# Patient Record
Sex: FEMALE | Race: WHITE
[De-identification: names, ages, dates, MRNs, and addresses within clinical notes are randomized per-mention and may not be internally consistent; named-entity substitution may affect disease eponyms.]

---

## 2017-01-08 ENCOUNTER — HOSPITAL ENCOUNTER (EMERGENCY)
Dept: HOSPITAL 47 - EC | Age: 27
Discharge: HOME | End: 2017-01-08
Payer: COMMERCIAL

## 2017-01-08 VITALS
TEMPERATURE: 97.3 F | HEART RATE: 69 BPM | SYSTOLIC BLOOD PRESSURE: 121 MMHG | RESPIRATION RATE: 18 BRPM | DIASTOLIC BLOOD PRESSURE: 67 MMHG

## 2017-01-08 DIAGNOSIS — Z3A.17: ICD-10-CM

## 2017-01-08 DIAGNOSIS — O99.512: Primary | ICD-10-CM

## 2017-01-08 DIAGNOSIS — J06.9: ICD-10-CM

## 2017-01-08 DIAGNOSIS — Z88.2: ICD-10-CM

## 2017-01-08 PROCEDURE — 99283 EMERGENCY DEPT VISIT LOW MDM: CPT

## 2017-01-08 NOTE — ED
General Adult HPI





- General


Chief complaint: Abdominal Pain


Stated complaint: cough


Time Seen by Provider: 17 15:49


Source: patient, RN notes reviewed


Mode of arrival: ambulatory


Limitations: no limitations





- History of Present Illness


Initial comments: 





Patient is a 26-year-old female who presents emergency room today with a chief 

complaint of cough congestion over the last week.  She states she is worried 

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 

pneumonia.  States she's had a cough no sputum production.  She states this 

started with a mild sore throat week ago which has gone away.  States she still 

having this cough which is deep.  She denies any other complaints.  Does admit 

that she 17 weeks pregnant.  States that she's felt some weird movements in her 

abdomen.  Patient denies any other complaints or symptoms.  Denies any vaginal 

bleeding or discharge. Patient denies any recent fever, chills, shortness of 

breath, chest pain, back pain, nausea or vomiting, numbness or tingling, 

dysuria or hematuria, constipation or diarrhea, headaches or visual changes, or 

any other complaints.





- Related Data


 Home Medications











 Medication  Instructions  Recorded  Confirmed


 


Pnv with Ca,No.72/Iron/FA 1 tab PO DAILY 17





[Prenatal Plus Tablet]   











 Allergies











Allergy/AdvReac Type Severity Reaction Status Date / Time


 


clarithromycin [From Biaxin] Allergy  Rash/Hives Verified 17 15:51














Review of Systems


ROS Statement: 


Those systems with pertinent positive or pertinent negative responses have been 

documented in the HPI.





ROS Other: All systems not noted in ROS Statement are negative.





Past Medical History


Past Medical History: No Reported History


History of Any Multi-Drug Resistant Organisms: None Reported


Past Surgical History:  Section


Past Psychological History: No Psychological Hx Reported


Smoking Status: Never smoker


Past Alcohol Use History: None Reported, Occasional


Past Drug Use History: None Reported





General Exam





- General Exam Comments


Initial Comments: 





General:  The patient is awake and alert, in no distress, and does not appear 

acutely ill. 


Eye:  Pupils are equal, round and reactive to light, extra-ocular movements are 

intact.  No nystagmus.  There is normal conjunctiva bilaterally.  No signs of 

icterus.  


Ears, nose, mouth and throat:  There are moist mucous membranes and no oral 

lesions. 


Neck:  The neck is supple, there is no tenderness or JVD.  


Cardiovascular:  There is a regular rate and rhythm. No murmur, rub or gallop 

is appreciated.


Respiratory:  Lungs are clear to auscultation, respirations are non-labored, 

breath sounds are equal.  No wheezes, stridor, rales, or rhonchi.


Gastrointestinal:  Soft, non-distended, non-tender abdomen without masses or 

organomegaly noted. There is no rebound or guarding present.  No CVA 

tenderness. Bowel sounds are unremarkable.


Musculoskeletal:  Normal ROM, no tenderness.  Strength 5/5. Sensation intact. 

Pulses equal bilaterally 2+.  


Neurological:  A&O x 3. CN II-XII intact, There are no obvious motor or sensory 

deficits. Coordination appears grossly intact. Speech is normal.


Skin:  Skin is warm and dry and no rashes or lesions are noted. 


Psychiatric:  Cooperative, appropriate mood & affect, normal judgment.  


Limitations: no limitations





Course


 Vital Signs











  17





  15:40


 


Temperature 98.9 F


 


Pulse Rate 90


 


Respiratory 20





Rate 


 


Blood Pressure 140/72


 


O2 Sat by Pulse 100





Oximetry 














Medical Decision Making





- Medical Decision Making





Patient's fetal heart tones 150s.  Patient denies any shortness of breath.  No 

pleuritic pain.  Vitals are stable.  Patient does admit to cough congestion 

started with a sore throat.  It was discussed about possible bronchitis.  Was 

discussed about x-ray.  Patient's 17 weeks pregnant.  No vaginal bleeding or 

discharge.  Patient denies most likely viral infection.  She states like to 

hold on x-ray.  Hold any antibiotics.  Comfortable following up over the next 2 

days.  Advised return if any symptoms increase or worsen.





Disposition


Clinical Impression: 


 Upper respiratory infection





Disposition: HOME SELF-CARE


Condition: Good


Instructions:  Upper Respiratory Infection (ED)


Additional Instructions: 


Please follow-up with family doctor in the next 2 days of symptoms have not 

improved.  Please return to emergency room if the symptoms increase or worsen 

or for any other concerns.


Time of Disposition: 17:20

## 2017-06-09 ENCOUNTER — HOSPITAL ENCOUNTER (INPATIENT)
Dept: HOSPITAL 47 - 4FBP | Age: 27
LOS: 2 days | Discharge: HOME | End: 2017-06-11
Payer: COMMERCIAL

## 2017-06-09 VITALS — BODY MASS INDEX: 45.2 KG/M2

## 2017-06-09 VITALS — RESPIRATION RATE: 16 BRPM

## 2017-06-09 DIAGNOSIS — Z79.899: ICD-10-CM

## 2017-06-09 DIAGNOSIS — L29.9: ICD-10-CM

## 2017-06-09 DIAGNOSIS — O34.211: Primary | ICD-10-CM

## 2017-06-09 DIAGNOSIS — Z3A.39: ICD-10-CM

## 2017-06-09 LAB
BASOPHILS # BLD AUTO: 0 K/UL (ref 0–0.2)
BASOPHILS NFR BLD AUTO: 0 %
CH: 29
CHCM: 33.4
EOSINOPHIL # BLD AUTO: 0.1 K/UL (ref 0–0.7)
EOSINOPHIL NFR BLD AUTO: 1 %
ERYTHROCYTE [DISTWIDTH] IN BLOOD BY AUTOMATED COUNT: 3.88 M/UL (ref 3.8–5.4)
ERYTHROCYTE [DISTWIDTH] IN BLOOD: 14.8 % (ref 11.5–15.5)
HCT VFR BLD AUTO: 33.8 % (ref 34–46)
HDW: 3.24
HGB BLD-MCNC: 11.3 GM/DL (ref 11.4–16)
LUC NFR BLD AUTO: 2 %
LYMPHOCYTES # SPEC AUTO: 1.4 K/UL (ref 1–4.8)
LYMPHOCYTES NFR SPEC AUTO: 18 %
MCH RBC QN AUTO: 29.1 PG (ref 25–35)
MCHC RBC AUTO-ENTMCNC: 33.4 G/DL (ref 31–37)
MCV RBC AUTO: 87.1 FL (ref 80–100)
MONOCYTES # BLD AUTO: 0.4 K/UL (ref 0–1)
MONOCYTES NFR BLD AUTO: 6 %
NEUTROPHILS # BLD AUTO: 5.7 K/UL (ref 1.3–7.7)
NEUTROPHILS NFR BLD AUTO: 74 %
WBC # BLD AUTO: 0.16 10*3/UL
WBC # BLD AUTO: 7.7 K/UL (ref 3.8–10.6)
WBC (PEROX): 7.98

## 2017-06-09 PROCEDURE — 86901 BLOOD TYPING SEROLOGIC RH(D): CPT

## 2017-06-09 PROCEDURE — 86900 BLOOD TYPING SEROLOGIC ABO: CPT

## 2017-06-09 PROCEDURE — 86850 RBC ANTIBODY SCREEN: CPT

## 2017-06-09 PROCEDURE — 85025 COMPLETE CBC W/AUTO DIFF WBC: CPT

## 2017-06-09 PROCEDURE — 88307 TISSUE EXAM BY PATHOLOGIST: CPT

## 2017-06-09 RX ADMIN — KETOROLAC TROMETHAMINE PRN MG: 30 INJECTION, SOLUTION INTRAMUSCULAR at 19:42

## 2017-06-09 RX ADMIN — POTASSIUM CHLORIDE SCH MLS/HR: 14.9 INJECTION, SOLUTION INTRAVENOUS at 09:40

## 2017-06-09 RX ADMIN — DOCUSATE SODIUM AND SENNOSIDES SCH EACH: 50; 8.6 TABLET ORAL at 19:42

## 2017-06-09 NOTE — P.OP
Date of Procedure: 17


Preoperative Diagnosis: 


39 week intrauterine pregnancy, previous  section, declining 


Postoperative Diagnosis: 


Repeat low transverse  section, unremarkable, nuchal cord 1, liveborn 

male infant


Procedure(s) Performed: 


Repeat low transverse  section


Implants: 





Anesthesia: spinal


Surgeon: Cele Myers


Assistant #1: Gwen Lam


Estimated Blood Loss (ml): 600


IV fluids (ml): 600


Urine output (ml): 100


Pathology: other (Placenta)


Condition: stable


Disposition: PACU


Indications for Procedure: 





Operative Findings: 





Description of Procedure: 


Patient is brought to the operating suite where a spinal analgesia with 

Duramorph is administered.  Patient was placed in the dorsal supine position 

with left lateral uterine displacement.  The abdomen is prepped and draped in 

usual sterile fashion.  Analgesia is checked and noted to be adequate.  The 

appropriate timeout is performed to assure proper patient and procedural 

identification.  2 g of Ancef are given.  A low transverse skin incision is 

made in this is carried down through the subcutaneous tissue which is 

approximate 6 cm deep.  The fascia is isolated, scored and extended bilaterally 

with curved Gray scissors.  Peritoneum is next identified and incised, there is 

no bowel or bladder involvement.  Bladder flap is brought down from previous 

scarring, bladder is well mobilized and at all times swept well from the 

operative field to avoid bladder and/or ureteral injury.





A repeat low transverse uterine incision is made.  This is carried down to this 

endometrial cavity, and extended bilaterally bluntly.  Artificial amniorrhexis 

reveals clear fluid.  Infant is delivered in the right occiput transverse 

position.  There is a nuchal cord 1.  The oropharynx, and external nares and 

nasopharynx were all bulb suctioned.  Patient is officially delivered of a male 

infant at 1005 hrs.  Umbilical cord is doubly clamped and ligated, he is handed 

to waiting nurses for evaluation where Apgar scores of 8 and 9 at one and 5 

minutes respectively are given.  The placenta is delivered manually, it is 

inspected and noted to be intact with trivascular cord at 1006 hrs.  The uterus 

is then externalized.  The edges are grasped with Huntley clamps.  The 

uterus is swept clean with a sterile sponge to avoid any retained products of 

conception.  The uterus is closed in a two-step fashion, first layer running 

locking, second layer imbricated, both with 0 Vicryl suture for excellent 

reapproximation and hemostasis.  Bilateral tubes and ovaries are inspected and 

noted to be normal.  Abdomen is suctioned with suction on guard and the uterus 

is gently placed back into the abdominal cavity.  Bilateral gutters are 

inspected and cleaned.





Peritoneum is allowed to close by secondary intention.  Fascia is closed in a 

running stitch, over ligation in the midline.  Subcutaneous tissue is irrigated

, noted to be clean and dry.  It is reapproximated with 3-0 Vicryl in a running 

fashion.  4-0 undyed Vicryl issues with a final subcuticular stitch for 

excellent reapproximation of the skin.  Steri-Strips and Mastisol are applied 

to the wound.  Sterile dressing is applied.  Uterus is then massaged.  No 

lochia is noted, therefore I dilate the cervix manually for a minimal to 

moderate amount of lochia.  Urine is clear in the Smiley tube as well as in the 

bag.  All sponge needle and enhancement counts are correct at the end of the 

procedure.  Patient is brought back to the recovery room in stable condition 

with stable vital signs blood pressure 118/58, pulse 81, respirations 16, 100% 

O2 saturation.  Complications none.  Patient is requesting circumcision for her 

 infant son.

## 2017-06-09 NOTE — P.HPOB
History of Present Illness


H&P Date: 17


Chief Complaint: Here for elective repeat 





This is a 26-year-old white female  2 para 1001 EDC 2017 at 39 

weeks gestation.  Patient presents this morning for repeat low transverse 

 section, she has been counseled thoroughly and is declining the option 

for .  She had a prior  section in .  She is declining option 

for tubal ligation.  She has been having rare irregular contractions.





Prenatal history blood type is A+ rubella status immune.  Group B strep 

cultures negative.  Gonorrhea and chlamydia cultures, hepatitis B surface 

antigen, HIV testing, VDRL all negative.  One-hour Glucola 104.





Past medical history is essentially negative.





Past surgical history  section .





Current medications prenatal vitamins daily.





ALLERGIES include clindamycin to which reports hives and rash.





Social history patient is single, she is a nonsmoker, she denies alcohol or 

drug use with this pregnancy.





On exam this is a pleasant white female 5 foot 5 inches 272 pounds blood 

pressure 117/79 vital signs are otherwise stable and the patient is afebrile.  

The general physical exam is within normal limits.  The abdomen is obviously 

gravid, fundal height is 40 cm, vertex presentation to Leopold's maneuvers.  

Cervix is long closed posterior.  Fetal heart rate is consistent with reactive 

NST.  Extremities reveal no edema.  Chest is clear.





Impression: 39 week intrauterine pregnancy, previous  section declining 

, here for repeat low transverse  section, declining tubal ligation.





Plan: We will proceed with repeat low transverse  section.  Patient is 

aware of all risks benefits and alternatives.  All questions answered.





Review of Systems


Constitutional: Reports as per HPI





Past Medical History


Past Medical History: No Reported History


History of Any Multi-Drug Resistant Organisms: None Reported


Past Surgical History:  Section


Past Anesthesia/Blood Transfusion Reactions: No Reported Reaction


Past Psychological History: No Psychological Hx Reported


Smoking Status: Never smoker


Past Alcohol Use History: None Reported


Past Drug Use History: None Reported





- Past Family History


  ** Mother


Family Medical History: No Reported History





  ** Father


Family Medical History: Hypertension





Medications and Allergies


 Home Medications











 Medication  Instructions  Recorded  Confirmed  Type


 


Pnv,Calcium 72/Iron/Folic Acid 1 tab PO DAILY 17 History





[Prenatal Plus Tablet]    











 Allergies











Allergy/AdvReac Type Severity Reaction Status Date / Time


 


clarithromycin [From Biaxin] Allergy  Rash/Hives Verified 17 08:30














Exam





- Vital Signs


Vital signs: 


 Vital Signs











  Temp Pulse Resp BP


 


 17 08:29  97.3 F L  106 H  16  117/79








 Intake and Output











 17





 22:59 06:59 14:59


 


Other:   


 


  Weight   123.377 kg








 Patient Weight











 06/10/17





 06:59


 


Weight 123.377 kg














See full dictation under HPI please





Results


Result Diagrams: 


 17 08:10





 Abnormal Lab Results - Last 24 Hours (Table)











  17 Range/Units





  08:10 


 


Hgb  11.3 L  (11.4-16.0)  gm/dL


 


Hct  33.8 L  (34.0-46.0)  %














Assessment and Plan


Plan: 





For repeat low transverse  section.  All risks benefits and 

alternatives once again reviewed.


Time with Patient: Less than 30

## 2017-06-10 LAB
BASOPHILS # BLD AUTO: 0 K/UL (ref 0–0.2)
BASOPHILS NFR BLD AUTO: 0 %
CH: 28.7
CHCM: 31.5
EOSINOPHIL # BLD AUTO: 0 K/UL (ref 0–0.7)
EOSINOPHIL NFR BLD AUTO: 1 %
ERYTHROCYTE [DISTWIDTH] IN BLOOD BY AUTOMATED COUNT: 3.5 M/UL (ref 3.8–5.4)
ERYTHROCYTE [DISTWIDTH] IN BLOOD: 15 % (ref 11.5–15.5)
HCT VFR BLD AUTO: 32 % (ref 34–46)
HDW: 2.9
HGB BLD-MCNC: 10.3 GM/DL (ref 11.4–16)
LUC NFR BLD AUTO: 1 %
LYMPHOCYTES # SPEC AUTO: 1.1 K/UL (ref 1–4.8)
LYMPHOCYTES NFR SPEC AUTO: 16 %
MCH RBC QN AUTO: 29.5 PG (ref 25–35)
MCHC RBC AUTO-ENTMCNC: 32.2 G/DL (ref 31–37)
MCV RBC AUTO: 91.5 FL (ref 80–100)
MONOCYTES # BLD AUTO: 0.4 K/UL (ref 0–1)
MONOCYTES NFR BLD AUTO: 5 %
NEUTROPHILS # BLD AUTO: 5.3 K/UL (ref 1.3–7.7)
NEUTROPHILS NFR BLD AUTO: 77 %
WBC # BLD AUTO: 0.09 10*3/UL
WBC # BLD AUTO: 6.9 K/UL (ref 3.8–10.6)
WBC (PEROX): 7.41

## 2017-06-10 RX ADMIN — POTASSIUM CHLORIDE SCH: 14.9 INJECTION, SOLUTION INTRAVENOUS at 06:04

## 2017-06-10 RX ADMIN — KETOROLAC TROMETHAMINE PRN MG: 30 INJECTION, SOLUTION INTRAMUSCULAR at 05:30

## 2017-06-10 RX ADMIN — ACETAMINOPHEN AND CODEINE PHOSPHATE PRN EACH: 300; 30 TABLET ORAL at 14:17

## 2017-06-10 RX ADMIN — POTASSIUM CHLORIDE SCH: 14.9 INJECTION, SOLUTION INTRAVENOUS at 06:05

## 2017-06-10 RX ADMIN — IBUPROFEN PRN MG: 600 TABLET ORAL at 10:18

## 2017-06-10 RX ADMIN — IBUPROFEN PRN MG: 600 TABLET ORAL at 17:41

## 2017-06-10 RX ADMIN — ACETAMINOPHEN AND CODEINE PHOSPHATE PRN EACH: 300; 30 TABLET ORAL at 00:24

## 2017-06-10 RX ADMIN — ACETAMINOPHEN AND CODEINE PHOSPHATE PRN EACH: 300; 30 TABLET ORAL at 20:30

## 2017-06-10 RX ADMIN — DOCUSATE SODIUM AND SENNOSIDES SCH: 50; 8.6 TABLET ORAL at 19:53

## 2017-06-10 NOTE — P.PN
Progress Note - Text





Date:6/10 Time:1528





Patient is status post . Patient seen this morning with VAS score of 

2. c/o of pruritus, c/o nausea/vomiting, comfortable and doing better today.

## 2017-06-10 NOTE — P.PN
Subjective


Principal diagnosis: 





Postoperative day #1





Slept well, lochia moderate, pain well controlled, no complaints, positive 

flatus.





Objective





- Vital Signs


Vital signs: 


 Vital Signs











Temp  98.5 F   06/10/17 08:00


 


Pulse  80   06/10/17 08:00


 


Resp  16   06/10/17 08:00


 


BP  107/66   06/10/17 08:00


 


Pulse Ox  99   06/09/17 19:48








 Intake & Output











 06/09/17 06/10/17 06/10/17





 18:59 06:59 18:59


 


Output Total 2200 500 


 


Balance -2200 -500 


 


Weight 123.377 kg  


 


Output:   


 


  Urine 1600 500 


 


    Uretheral (Smiley) 600  


 


  Estimated Blood Loss 600  


 


Other:   


 


  # Voids  1 














- Constitutional


General appearance: Present: average body habitus, cooperative





- EENT


Eyes: Present: PERRLA


ENT: Present: hearing grossly normal





- Neck


Neck: Present: normal ROM


Thyroid: bilateral: normal size





- Respiratory


Respiratory: bilateral: CTA





- Cardiovascular


Rhythm: regular





- Gastrointestinal


General gastrointestinal: Present: normal bowel sounds





- Genitourinary


Genitourinary Comment(s): 





Undescended firm, midline, symmetric, nontender, 18 week size





- Integumentary


Integumentary Comment(s): 





Incision clean and dry, intact, Steri-Strips applied, well approximated.





- Neurologic


Neurologic: Present: CNII-XII intact





- Musculoskeletal


Musculoskeletal: Present: gait normal, strength equal bilaterally





- Psychiatric


Psychiatric: Present: A&O x's 3, appropriate affect, intact judgment & insight





- Labs


CBC & Chem 7: 


 06/10/17 08:05





Labs: 


 Abnormal Lab Results - Last 24 Hours (Table)











  06/10/17 Range/Units





  08:05 


 


RBC  3.50 L  (3.80-5.40)  m/uL


 


Hgb  10.3 L  (11.4-16.0)  gm/dL


 


Hct  32.0 L  (34.0-46.0)  %














Assessment and Plan


Plan: 





Continue postoperative care.  Circumcision now.  Likely discharge home tomorrow.


Time with Patient: Less than 30

## 2017-06-11 VITALS — TEMPERATURE: 97.5 F | HEART RATE: 74 BPM | DIASTOLIC BLOOD PRESSURE: 75 MMHG | SYSTOLIC BLOOD PRESSURE: 118 MMHG

## 2017-06-11 RX ADMIN — IBUPROFEN PRN MG: 600 TABLET ORAL at 00:46

## 2017-06-11 RX ADMIN — IBUPROFEN PRN MG: 600 TABLET ORAL at 04:57

## 2017-06-11 RX ADMIN — ACETAMINOPHEN AND CODEINE PHOSPHATE PRN EACH: 300; 30 TABLET ORAL at 08:19

## 2017-06-11 RX ADMIN — IBUPROFEN PRN MG: 600 TABLET ORAL at 11:36

## 2017-06-11 NOTE — P.DS
Providers


Date of admission: 


17 07:51





Expected date of discharge: 17


Attending physician: 


Cele Myers





Primary care physician: 


[facility] Course: 





This is a 26-year-old white female  2 para 1001 EDC 2017 at 39 

weeks gestation.  Patient presented after an unremarkable prenatal course for 

repeat  section.  She declined the option for .  She declined the 

option for tubal ligation.  Her blood type is A+, group B strep cultures 

negative.  Please see my dictated history and physical for details.  She 

underwent a low-transverse  section and delivered a liveborn male 

infant with Apgar scores of 8 and 9 at one and 5 minutes respectively.  Infant 

weighed 7 lbs. 11 oz. or 3500 g.  There was an estimated blood loss recorded of 

600 mL's.  There was a nuchal cord 1.  Please see my dictated operative note 

for details.





This morning the patient is doing well.  She is voiding, ambulating and passing 

flatus without difficulty.  Vital signs are stable and she is afebrile.  Fundus 

is firm and in the midline, symmetric and 18 week size.  Extremities are 

negative for edema.  Breasts are not engorged.   infant is doing well, 

circumcision has been performed.  There is minimal to moderate lochia rubra.  

The abdominal incision is well approximated, clean and dry, Steri-Strips 

applied.  





Patient is being discharged home in very good condition.  She will follow-up 

with me in the office in 2 weeks.  I have reminded her no intercourse, tampons 

or douching.  She will use over-the-counter ibuprofen products, 200 mg pills, 

for every 8 hours as needed for pain.  I've asked her to call me with any 

fevers shakes or chills, foul smelling or copious lochia, with the passage of 

large blood clots, with any pain not alleviated by ibuprofen, or indeed with 

any concerns.  She is bottlefeeding.  She will continue taking her prenatal 

vitamin every day.  No driving for 2 weeks.  No heavy lifting.  We have briefly 

reviewed the options for contraception and we will discuss this further in the 

office.


Patient Condition at Discharge: Good





Plan - Discharge Summary


New Discharge Prescriptions: 


No Action


   Pnv,Calcium 72/Iron/Folic Acid [Prenatal Plus Tablet] 1 tab PO DAILY


Discharge Medication List





Pnv,Calcium 72/Iron/Folic Acid [Prenatal Plus Tablet] 1 tab PO DAILY 17 [

History]








Follow up Appointment(s)/Referral(s): 


Cele Myers MD [STAFF PHYSICIAN] - 2 Weeks


Discharge Disposition: HOME SELF-CARE

## 2017-09-04 ENCOUNTER — HOSPITAL ENCOUNTER (EMERGENCY)
Dept: HOSPITAL 47 - EC | Age: 27
Discharge: HOME | End: 2017-09-04
Payer: COMMERCIAL

## 2017-09-04 VITALS
HEART RATE: 83 BPM | DIASTOLIC BLOOD PRESSURE: 60 MMHG | SYSTOLIC BLOOD PRESSURE: 127 MMHG | RESPIRATION RATE: 18 BRPM | TEMPERATURE: 98.9 F

## 2017-09-04 DIAGNOSIS — A09: Primary | ICD-10-CM

## 2017-09-04 DIAGNOSIS — I88.0: ICD-10-CM

## 2017-09-04 DIAGNOSIS — Z88.1: ICD-10-CM

## 2017-09-04 DIAGNOSIS — R16.1: ICD-10-CM

## 2017-09-04 LAB
ALP SERPL-CCNC: 64 U/L (ref 38–126)
ALT SERPL-CCNC: 25 U/L (ref 9–52)
AMYLASE SERPL-CCNC: 60 U/L (ref 30–110)
ANION GAP SERPL CALC-SCNC: 9 MMOL/L
AST SERPL-CCNC: 19 U/L (ref 14–36)
BASOPHILS # BLD AUTO: 0 K/UL (ref 0–0.2)
BASOPHILS NFR BLD AUTO: 1 %
BUN SERPL-SCNC: 11 MG/DL (ref 7–17)
CALCIUM SPEC-MCNC: 9.5 MG/DL (ref 8.4–10.2)
CH: 29.6
CHCM: 33.7
CHLORIDE SERPL-SCNC: 103 MMOL/L (ref 98–107)
CO2 SERPL-SCNC: 25 MMOL/L (ref 22–30)
EOSINOPHIL # BLD AUTO: 0.1 K/UL (ref 0–0.7)
EOSINOPHIL NFR BLD AUTO: 1 %
ERYTHROCYTE [DISTWIDTH] IN BLOOD BY AUTOMATED COUNT: 4.39 M/UL (ref 3.8–5.4)
ERYTHROCYTE [DISTWIDTH] IN BLOOD: 16 % (ref 11.5–15.5)
GLUCOSE SERPL-MCNC: 104 MG/DL (ref 74–99)
HCT VFR BLD AUTO: 38.7 % (ref 34–46)
HDW: 2.92
HGB BLD-MCNC: 12.6 GM/DL (ref 11.4–16)
LUC NFR BLD AUTO: 2 %
LYMPHOCYTES # SPEC AUTO: 1.1 K/UL (ref 1–4.8)
LYMPHOCYTES NFR SPEC AUTO: 15 %
MCH RBC QN AUTO: 28.7 PG (ref 25–35)
MCHC RBC AUTO-ENTMCNC: 32.6 G/DL (ref 31–37)
MCV RBC AUTO: 88.2 FL (ref 80–100)
MONOCYTES # BLD AUTO: 0.3 K/UL (ref 0–1)
MONOCYTES NFR BLD AUTO: 4 %
NEUTROPHILS # BLD AUTO: 5.8 K/UL (ref 1.3–7.7)
NEUTROPHILS NFR BLD AUTO: 79 %
NON-AFRICAN AMERICAN GFR(MDRD): >60
PH UR: 5.5 [PH] (ref 5–8)
POTASSIUM SERPL-SCNC: 4 MMOL/L (ref 3.5–5.1)
PROT SERPL-MCNC: 7.9 G/DL (ref 6.3–8.2)
SODIUM SERPL-SCNC: 137 MMOL/L (ref 137–145)
SP GR UR: 1.01 (ref 1–1.03)
UA BILLING (MACRO VS. MICRO): (no result)
UROBILINOGEN UR QL STRIP: <2 MG/DL (ref ?–2)
WBC # BLD AUTO: 0.13 10*3/UL
WBC # BLD AUTO: 7.4 K/UL (ref 3.8–10.6)
WBC (PEROX): 7

## 2017-09-04 PROCEDURE — 99285 EMERGENCY DEPT VISIT HI MDM: CPT

## 2017-09-04 PROCEDURE — 81025 URINE PREGNANCY TEST: CPT

## 2017-09-04 PROCEDURE — 74177 CT ABD & PELVIS W/CONTRAST: CPT

## 2017-09-04 PROCEDURE — 74020: CPT

## 2017-09-04 PROCEDURE — 82150 ASSAY OF AMYLASE: CPT

## 2017-09-04 PROCEDURE — 83605 ASSAY OF LACTIC ACID: CPT

## 2017-09-04 PROCEDURE — 80053 COMPREHEN METABOLIC PANEL: CPT

## 2017-09-04 PROCEDURE — 96374 THER/PROPH/DIAG INJ IV PUSH: CPT

## 2017-09-04 PROCEDURE — 85025 COMPLETE CBC W/AUTO DIFF WBC: CPT

## 2017-09-04 PROCEDURE — 81003 URINALYSIS AUTO W/O SCOPE: CPT

## 2017-09-04 PROCEDURE — 87086 URINE CULTURE/COLONY COUNT: CPT

## 2017-09-04 PROCEDURE — 36415 COLL VENOUS BLD VENIPUNCTURE: CPT

## 2017-09-04 PROCEDURE — 96375 TX/PRO/DX INJ NEW DRUG ADDON: CPT

## 2017-09-04 PROCEDURE — 96361 HYDRATE IV INFUSION ADD-ON: CPT

## 2017-09-04 PROCEDURE — 83690 ASSAY OF LIPASE: CPT

## 2017-09-04 NOTE — XR
Exam: Abdomen 2 view.

 

TECHNIQUE: 3 views the abdomen were obtained.

 

HISTORY: Abdominal pain.

 

FINDINGS:

 

There is a nonspecific, nonobstructive, bowel gas pattern. There is questionable bowel wall thickenin
g noted in the ascending colon. This is only seen on one view. There is no evidence of impaction or o
bstruction. Phleboliths are noted in the pelvis.

 

IMPRESSION:

 

Nonspecific bowel gas pattern.

## 2017-09-04 NOTE — ED
General Adult HPI





- General


Chief complaint: Abdominal Pain


Stated complaint: Abd pain


Time Seen by Provider: 17 17:40


Source: patient, RN notes reviewed, old records reviewed


Mode of arrival: ambulatory


Limitations: no limitations





- History of Present Illness


Initial comments: 





If complaint history of present illness a 27-year-old female here with a 

complaint of acute pain that started at 3 AM this morning.  Followed by 

diarrhea.  Patient doesn't past history of IBS.Loose stool for the past several 

hours.  She describes it as sharp contraction-like pains.





Emergency room temperature 100.7.





- Related Data


 Previous Rx's











 Medication  Instructions  Recorded


 


Ciprofloxacin HCl [Cipro] 500 mg PO Q12HR #10 tablet 17











 Allergies











Allergy/AdvReac Type Severity Reaction Status Date / Time


 


clarithromycin [From Biaxin] Allergy  Swelling Verified 17 18:11














Review of Systems


ROS Statement: 


Those systems with pertinent positive or pertinent negative responses have been 

documented in the HPI.


History of systems no visual acuity changes no headache no chest pain no 

shortness of breath.  She has abdominal discomfort admitted abdomen suprapubic 

region.  Nauseated but no vomiting loose stool earlier today.  No blood noted 

in the stool.  Decreased appetite.  She did take ibuprofen without relief of 

discomfort.  All systems were reviewed past medical problems no chronic medical 

problems.  Other than 1 episode of IBS.  Surgeries 2 C-sections last one 

approximately 10 weeks ago.  Family history no cancers known to her.  She has 

ALLERGIES to clarithromycin.  Nonsmoker nondrinker.








ROS Other: All systems not noted in ROS Statement are negative.





Past Medical History


Past Medical History: No Reported History


History of Any Multi-Drug Resistant Organisms: None Reported


Past Surgical History:  Section


Past Anesthesia/Blood Transfusion Reactions: No Reported Reaction


Past Psychological History: No Psychological Hx Reported


Smoking Status: Never smoker


Past Alcohol Use History: None Reported


Past Drug Use History: None Reported





- Past Family History


  ** Mother


Family Medical History: No Reported History





  ** Father


Family Medical History: Hypertension





General Exam





- General Exam Comments


Initial Comments: 


General:


The patient is awake and alert, into abdominal discomfort.  With fever.  Vital 

signs temp 100.7 pulse 142 respiratory rate 16 pulse ox 99% room air blood 

pressure 126/81


Eye:


Pupils are equal, round and reactive to light, extra-ocular movements are intact

; there is normal conjunctiva bilaterally. No signs of icterus. 


Ears, nose, mouth and throat:


There are moist mucous membranes and no oral lesions. 


Neck:


The neck is supple, there is no tenderness on the no anterior cervical 

lymphadenopathy, thyroid not enlarged.


Cardiovascular:


Tachycardic heart rate, 142.  No murmur, rub or gallop is appreciated.


Respiratory:


Lungs are clear to auscultation, respirations are non-labored, breath sounds 

are equal. No wheezes, stridor, rales, or rhonchi.


Gastrointestinal:


Tenderness with deep palpation from the mid abdomen to the left and right lower 

quadrants.


Back:


There is no tenderness to palpation in the midline. There is no obvious 

deformity. No rashes noted. 


Musculoskeletal:


Normal ROM, no tenderness, There is no pedal edema. There is no calf tenderness 

or swelling. Sensation intact. Pulses equal bilaterally 2+. 


Neurological:


No neuro deficits complained of or noted.


Skin:


Skin is warm and dry and no rashes or lesions are noted. 


 


 


Limitations: no limitations





Course


 Vital Signs











  17





  17:24 19:04 20:04


 


Temperature 100.7 F H 98.3 F 101.4 F H


 


Pulse Rate 142 H 105 H 99


 


Respiratory 16 16 16





Rate   


 


Blood Pressure 126/81 130/59 135/65


 


O2 Sat by Pulse 99 99 99





Oximetry   














  17





  22:44


 


Temperature 98.7 F


 


Pulse Rate 81


 


Respiratory 16





Rate 


 


Blood Pressure 009/71


 


O2 Sat by Pulse 98





Oximetry 














Medical Decision Making





- Medical Decision Making





Medical decision making; the patient's white count 7.4 hemoglobin 12 medical 38

, potassium 4.0 with a BUN 11 creatinine 0.7 GFR greater than 60.  Glucose 104.

  Amylase lipase normal limits.  Urine clean no signs of infection.  Urine 

pregnancy test negative.








Trays of the abdomen done and reviewed by radiologist his findings are there is 

a nonspecific, nonobstructive, bowel gas pattern.  There was questionable bowel 

wall thickening noted in the ascending colon.  This is only seen on one view.  

There is no evidence of impaction or obstruction.  Phleboliths are noted in the 

pelvis.  Impression; nonspecific bowel gas pattern.  As read by Dr. Trejo








Patient has CT of the abdomen with IV and oral contrast.  The positive findings 

include stomach and bowel.  Wall thickening of the distal small bowel and 

terminal ileum seen with surrounding mild inflammatory changes, likely 

representing infectious versus inflammatory enteritis.  No obstruction.  No 

evidence of acute fluid collection.


Normal appendix is seen.  Spleen the spleen is mildly enlarged, measuring up to 

14.4 cm in greatest craniocaudad dimensions, of unknown etiology.  This was 

discussed with the patient she'll follow-up with family physician concerning 

this for further evaluation as needed.  Final impression reactive mesenteric 

lymph nodes.  Mild splenomegaly of unknown significance.  Small amount of 

pelvic fluid which is likely physiologic versus related to the above-mentioned 

pathology.  The above-mentioned pathology was wall thickening of the distal 

small bowel and terminal ileum with surrounding mild inflammatory changes, 

likely representing infectious versus inflammatory enteritis.  And a probable 

2.6 and left ovarian cyst as read by Dr. olguin











Patient states she is feeling better.  She was rehydrated the emergency room.  

Labs as noted above were discussed with the patient she will be placed on Cipro 

500 twice a day for 5 days











- Lab Data


Result diagrams: 


 17 18:21





 17 18:21


 Lab Results











  17 Range/Units





  18:21 18:21 18:21 


 


WBC   7.4   (3.8-10.6)  k/uL


 


RBC   4.39   (3.80-5.40)  m/uL


 


Hgb   12.6   (11.4-16.0)  gm/dL


 


Hct   38.7   (34.0-46.0)  %


 


MCV   88.2   (80.0-100.0)  fL


 


MCH   28.7   (25.0-35.0)  pg


 


MCHC   32.6   (31.0-37.0)  g/dL


 


RDW   16.0 H   (11.5-15.5)  %


 


Plt Count   270   (150-450)  k/uL


 


Neutrophils %   79   %


 


Lymphocytes %   15   %


 


Monocytes %   4   %


 


Eosinophils %   1   %


 


Basophils %   1   %


 


Neutrophils #   5.8   (1.3-7.7)  k/uL


 


Lymphocytes #   1.1   (1.0-4.8)  k/uL


 


Monocytes #   0.3   (0-1.0)  k/uL


 


Eosinophils #   0.1   (0-0.7)  k/uL


 


Basophils #   0.0   (0-0.2)  k/uL


 


Sodium  137    (137-145)  mmol/L


 


Potassium  4.0    (3.5-5.1)  mmol/L


 


Chloride  103    ()  mmol/L


 


Carbon Dioxide  25    (22-30)  mmol/L


 


Anion Gap  9    mmol/L


 


BUN  11    (7-17)  mg/dL


 


Creatinine  0.70    (0.52-1.04)  mg/dL


 


Est GFR (MDRD) Af Amer  >60    (>60 ml/min/1.73 sqM)  


 


Est GFR (MDRD) Non-Af  >60    (>60 ml/min/1.73 sqM)  


 


Glucose  104 H    (74-99)  mg/dL


 


Plasma Lactic Acid Deo    1.2  (0.7-2.0)  mmol/L


 


Calcium  9.5    (8.4-10.2)  mg/dL


 


Total Bilirubin  0.7    (0.2-1.3)  mg/dL


 


AST  19    (14-36)  U/L


 


ALT  25    (9-52)  U/L


 


Alkaline Phosphatase  64    ()  U/L


 


Total Protein  7.9    (6.3-8.2)  g/dL


 


Albumin  4.1    (3.5-5.0)  g/dL


 


Amylase  60    ()  U/L


 


Lipase  104    ()  U/L


 


Urine Color     


 


Urine Appearance     (Clear)  


 


Urine pH     (5.0-8.0)  


 


Ur Specific Gravity     (1.001-1.035)  


 


Urine Protein     (Negative)  


 


Urine Glucose (UA)     (Negative)  


 


Urine Ketones     (Negative)  


 


Urine Blood     (Negative)  


 


Urine Nitrite     (Negative)  


 


Urine Bilirubin     (Negative)  


 


Urine Urobilinogen     (<2.0)  mg/dL


 


Ur Leukocyte Esterase     (Negative)  


 


Urine HCG, Qual     (Not Detectd)  














  17 Range/Units





  19:30 19:30 


 


WBC    (3.8-10.6)  k/uL


 


RBC    (3.80-5.40)  m/uL


 


Hgb    (11.4-16.0)  gm/dL


 


Hct    (34.0-46.0)  %


 


MCV    (80.0-100.0)  fL


 


MCH    (25.0-35.0)  pg


 


MCHC    (31.0-37.0)  g/dL


 


RDW    (11.5-15.5)  %


 


Plt Count    (150-450)  k/uL


 


Neutrophils %    %


 


Lymphocytes %    %


 


Monocytes %    %


 


Eosinophils %    %


 


Basophils %    %


 


Neutrophils #    (1.3-7.7)  k/uL


 


Lymphocytes #    (1.0-4.8)  k/uL


 


Monocytes #    (0-1.0)  k/uL


 


Eosinophils #    (0-0.7)  k/uL


 


Basophils #    (0-0.2)  k/uL


 


Sodium    (137-145)  mmol/L


 


Potassium    (3.5-5.1)  mmol/L


 


Chloride    ()  mmol/L


 


Carbon Dioxide    (22-30)  mmol/L


 


Anion Gap    mmol/L


 


BUN    (7-17)  mg/dL


 


Creatinine    (0.52-1.04)  mg/dL


 


Est GFR (MDRD) Af Amer    (>60 ml/min/1.73 sqM)  


 


Est GFR (MDRD) Non-Af    (>60 ml/min/1.73 sqM)  


 


Glucose    (74-99)  mg/dL


 


Plasma Lactic Acid Deo    (0.7-2.0)  mmol/L


 


Calcium    (8.4-10.2)  mg/dL


 


Total Bilirubin    (0.2-1.3)  mg/dL


 


AST    (14-36)  U/L


 


ALT    (9-52)  U/L


 


Alkaline Phosphatase    ()  U/L


 


Total Protein    (6.3-8.2)  g/dL


 


Albumin    (3.5-5.0)  g/dL


 


Amylase    ()  U/L


 


Lipase    ()  U/L


 


Urine Color   Yellow  


 


Urine Appearance   Clear  (Clear)  


 


Urine pH   5.5  (5.0-8.0)  


 


Ur Specific Gravity   1.013  (1.001-1.035)  


 


Urine Protein   Negative  (Negative)  


 


Urine Glucose (UA)   Negative  (Negative)  


 


Urine Ketones   Negative  (Negative)  


 


Urine Blood   Negative  (Negative)  


 


Urine Nitrite   Negative  (Negative)  


 


Urine Bilirubin   Negative  (Negative)  


 


Urine Urobilinogen   <2.0  (<2.0)  mg/dL


 


Ur Leukocyte Esterase   Negative  (Negative)  


 


Urine HCG, Qual  Not Detected   (Not Detectd)  














Disposition


Clinical Impression: 


 Infectious enteritis





Disposition: HOME SELF-CARE


Condition: Fair


Instructions:  Abdominal Pain in Children (ED), Traveler's Diarrhea (ED), 

Nutrition Tips for Relief of Diarrhea (ED)


Additional Instructions: 


Take Pepto-Bismol, increase fluids, take Cipro twice day for 5 days.  Follow-up 

with his family physician for evaluation of mildly enlarged spleen.  Take 

Tylenol for pain and fever


Prescriptions: 


Ciprofloxacin HCl [Cipro] 500 mg PO Q12HR #10 tablet


Referrals: 


Marito Brown DO [Primary Care Provider] - 1-2 days


Time of Disposition: 23:26

## 2017-09-04 NOTE — CT
EXAM:

  CT Abdomen and Pelvis With Intravenous Contrast

 

CLINICAL HISTORY:

Abdominal pain with diarrhea.

 

TECHNIQUE:

  Axial computed tomography images of the abdomen and pelvis with 

intravenous contrast.  CTDI is 50.40 mGy and DLP is 2072.10 mGy-cm.  This 

CT exam was performed using one or more of the following dose reduction 

techniques: automated exposure control, adjustment of the mA and/or kV 

according to patient size, and/or use of iterative reconstruction 

technique.

 

COMPARISON:

  No relevant prior studies available.

 

FINDINGS:

  Lower thorax: No acute findings.

 

 ABDOMEN:

  Liver:  Probable Riedel's lobe is seen.  No mass.

  Gallbladder and bile ducts:  Unremarkable.  No calcified stones.  No 

ductal dilation.

  Pancreas:  Unremarkable.  No mass.  No ductal dilation.

  Spleen:  The spleen is mildly enlarged, measuring up to 14.4 in 

greatest craniocaudad dimension, of unknown significance.

  Adrenals:  Unremarkable.  No mass.

  Kidneys and ureters:  Unremarkable.  No solid mass.  No hydronephrosis.

  Stomach and bowel:  Wall thickening of the distal small bowel and 

terminal ileum is seen with surrounding mild inflammatory changes, likely 

representing infectious versus inflammatory enteritis.  No obstruction.  

No evidence of acute fluid collection.

  Appendix:  A normal appendix is seen.

 

 PELVIS:

  Bladder:  Unremarkable.  No mass.

  Reproductive:  Probable 2.6 cm left ovarian cyst.  Probable small 

additional fibroid in the anterior uterine body.

 

 ABDOMEN and PELVIS:

  Intraperitoneal space:  A small amount of pelvic free fluid is seen, 

which is likely physiologic versus related to the above mentioned 

pathology.  No free air.

  Bones/joints:  No acute fracture.  No dislocation.

  Soft tissues:  Unremarkable.

  Vasculature:  Unremarkable.  No abdominal aortic aneurysm.

  Lymph nodes:  Mildly prominent mesenteric lymph nodes are noted, 

predominantly involving the right lower quadrant, measuring up to 1.2 cm 

in short axis, likely reactive.  Shotty retroperitoneal lymph nodes, of 

unknown significance.

 

IMPRESSION:     

1. Wall thickening of the distal small bowel and terminal ileum with 

surrounding mild inflammatory changes, likely representing infectious 

versus inflammatory enteritis.

2. Likely reactive mesenteric lymph nodes.

3. Mild splenomegaly, of unknown significance.

4. Small amount of pelvic free fluid, which is likely physiologic versus 

related to the above mentioned pathology.

5. Probable 2.6 cm left ovarian cyst.

## 2018-01-05 ENCOUNTER — HOSPITAL ENCOUNTER (OUTPATIENT)
Dept: HOSPITAL 47 - LABWHC1 | Age: 28
Discharge: HOME | End: 2018-01-05
Payer: COMMERCIAL

## 2018-01-05 DIAGNOSIS — R10.30: Primary | ICD-10-CM

## 2018-01-05 DIAGNOSIS — R53.83: ICD-10-CM

## 2018-01-05 LAB
ALBUMIN SERPL-MCNC: 4.4 G/DL (ref 3.5–5)
ALP SERPL-CCNC: 56 U/L (ref 38–126)
ALT SERPL-CCNC: 45 U/L (ref 9–52)
ANION GAP SERPL CALC-SCNC: 11 MMOL/L
AST SERPL-CCNC: 24 U/L (ref 14–36)
BASOPHILS # BLD AUTO: 0 K/UL (ref 0–0.2)
BASOPHILS NFR BLD AUTO: 1 %
BUN SERPL-SCNC: 21 MG/DL (ref 7–17)
CALCIUM SPEC-MCNC: 10.3 MG/DL (ref 8.4–10.2)
CHLORIDE SERPL-SCNC: 102 MMOL/L (ref 98–107)
CO2 SERPL-SCNC: 29 MMOL/L (ref 22–30)
EOSINOPHIL # BLD AUTO: 0.2 K/UL (ref 0–0.7)
EOSINOPHIL NFR BLD AUTO: 2 %
ERYTHROCYTE [DISTWIDTH] IN BLOOD BY AUTOMATED COUNT: 4.46 M/UL (ref 3.8–5.4)
ERYTHROCYTE [DISTWIDTH] IN BLOOD: 15.9 % (ref 11.5–15.5)
GLUCOSE SERPL-MCNC: 103 MG/DL (ref 74–99)
HCT VFR BLD AUTO: 41.4 % (ref 34–46)
HGB BLD-MCNC: 12.5 GM/DL (ref 11.4–16)
LYMPHOCYTES # SPEC AUTO: 2.2 K/UL (ref 1–4.8)
LYMPHOCYTES NFR SPEC AUTO: 27 %
MCH RBC QN AUTO: 28 PG (ref 25–35)
MCHC RBC AUTO-ENTMCNC: 30.2 G/DL (ref 31–37)
MCV RBC AUTO: 92.8 FL (ref 80–100)
MONOCYTES # BLD AUTO: 0.3 K/UL (ref 0–1)
MONOCYTES NFR BLD AUTO: 4 %
NEUTROPHILS # BLD AUTO: 5.2 K/UL (ref 1.3–7.7)
NEUTROPHILS NFR BLD AUTO: 65 %
PLATELET # BLD AUTO: 314 K/UL (ref 150–450)
POTASSIUM SERPL-SCNC: 4.5 MMOL/L (ref 3.5–5.1)
PROT SERPL-MCNC: 8.4 G/DL (ref 6.3–8.2)
SODIUM SERPL-SCNC: 142 MMOL/L (ref 137–145)
T4 FREE SERPL-MCNC: 1.23 NG/DL (ref 0.78–2.19)
WBC # BLD AUTO: 8 K/UL (ref 3.8–10.6)

## 2018-01-05 PROCEDURE — 85652 RBC SED RATE AUTOMATED: CPT

## 2018-01-05 PROCEDURE — 84481 FREE ASSAY (FT-3): CPT

## 2018-01-05 PROCEDURE — 80053 COMPREHEN METABOLIC PANEL: CPT

## 2018-01-05 PROCEDURE — 83516 IMMUNOASSAY NONANTIBODY: CPT

## 2018-01-05 PROCEDURE — 85025 COMPLETE CBC W/AUTO DIFF WBC: CPT

## 2018-01-05 PROCEDURE — 36415 COLL VENOUS BLD VENIPUNCTURE: CPT

## 2018-01-05 PROCEDURE — 84443 ASSAY THYROID STIM HORMONE: CPT

## 2018-01-05 PROCEDURE — 84439 ASSAY OF FREE THYROXINE: CPT

## 2018-01-05 PROCEDURE — 86140 C-REACTIVE PROTEIN: CPT

## 2018-10-22 ENCOUNTER — HOSPITAL ENCOUNTER (OUTPATIENT)
Dept: HOSPITAL 47 - BARWHC3 | Age: 28
Discharge: HOME | End: 2018-10-22
Attending: SURGERY
Payer: COMMERCIAL

## 2018-10-22 VITALS — DIASTOLIC BLOOD PRESSURE: 59 MMHG | SYSTOLIC BLOOD PRESSURE: 97 MMHG | TEMPERATURE: 97.8 F | HEART RATE: 71 BPM

## 2018-10-22 VITALS — BODY MASS INDEX: 52.7 KG/M2

## 2018-10-22 DIAGNOSIS — E66.01: Primary | ICD-10-CM

## 2018-10-22 PROCEDURE — 99211 OFF/OP EST MAY X REQ PHY/QHP: CPT

## 2018-10-22 NOTE — P.HPBAR
Bariatric H&P





- History & Physicial


H&P Date: 10/22/18


History & Physicial: 


Visit/CC: sleeve consult





Patient initial contact: 





Initial weight: 122.47 kg


Initial weight in pounds: 270.00


Height: 5 ft


Initial BMI: 52.7





Last weight: 





Current weight: 122.47 kg


Current weight in pounds: 270.00


Current BMI: 52.7





Ideal body weight (based on NIH guidelines): 45.359 kg





Excess body weight loss: 0.0%








The patient is a 28 year-old F who presents for Bariatric Assessment.  The 

patient presents today for new patient sleeve gastrectomy consultation.  She 

has had lifetime problems obesity.  Her BMI is 53.  She is requesting sleeve 

gastrectomy.  Patient is attended informational seminar.  She appears to be 

well versed in the sleeve gastrectomy.  She understands the risks and 

complications of procedure including gastric perforation














Past Medical History


Past Medical History: No Reported History


Additional Past Medical History / Comment(s): FREQUENT HEADACHES, LOWER BACK 

PAIN,  STATES HAVING DIARRHEA , NAUSEA AND STOMACH PAIN., STATES CURRENT "COLD" 

WITH COUGH- PT TO NOTIFY DR LUKAS IMRZA.


History of Any Multi-Drug Resistant Organisms: None Reported


Past Surgical History:  Section


Past Anesthesia/Blood Transfusion Reactions: No Reported Reaction


Past Psychological History: Anxiety, Depression


Smoking Status: Never smoker


Past Alcohol Use History: Occasional


Past Drug Use History: None Reported





- Past Family History


  ** Mother


Family Medical History: No Reported History





  ** Father


Family Medical History: Hypertension





Surgical - Exam


 Vital Signs











Temp Pulse BP


 


 97.8 F   71   97/59 


 


 10/22/18 14:15  10/22/18 14:15  10/22/18 14:15














BMI 53





- General


well developed





- Eyes


PERRL





- ENT


normal pinna





- Neck


no masses





- Respiratory


normal expansion





- Cardiovascular


Rhythm: regular





- Abdomen


Abdomen: soft, non tender





Bariatric Assessment & Plan


Plan: 





Morbid obesity with BMI 53.  Patient be scheduled for EGD.  We will perform 

sleeve gastrectomy once her insurance authorization is complete.





Bariatric Checklist


Checklist: 


Plan: 





Checklist: 





EGD: 


1. Hiatal hernia: 


2. H. Pylori: 





HgbA1c: 





Vitamin D: 





Smoking: Never smoker





Primary care physician referral: Dr. gerardo Brown ([facility])





Psychiatry clearance: 





Cardiology clearance: 





Sleep study: 





Diet journal: 





VTE risk score: 





VTE risk level: 





Rehab needs at discharge:

## 2018-11-12 ENCOUNTER — HOSPITAL ENCOUNTER (OUTPATIENT)
Dept: HOSPITAL 47 - LABWHC1 | Age: 28
End: 2018-11-12
Attending: SURGERY
Payer: COMMERCIAL

## 2018-11-12 DIAGNOSIS — Z98.84: ICD-10-CM

## 2018-11-12 DIAGNOSIS — E44.0: ICD-10-CM

## 2018-11-12 DIAGNOSIS — Z48.815: Primary | ICD-10-CM

## 2018-11-12 DIAGNOSIS — E66.01: ICD-10-CM

## 2018-11-12 LAB
ALBUMIN SERPL-MCNC: 4.5 G/DL (ref 3.8–4.9)
ALBUMIN/GLOB SERPL: 1.55 G/DL (ref 1.2–2.1)
ALP SERPL-CCNC: 62 U/L (ref 41–126)
ALT SERPL-CCNC: 14 U/L (ref 8–44)
ANION GAP SERPL CALC-SCNC: 6.8 MMOL/L (ref 4–12)
AST SERPL-CCNC: 19 U/L (ref 13–35)
BUN SERPL-SCNC: 19 MG/DL (ref 9–27)
CALCIUM SPEC-MCNC: 9.3 MG/DL (ref 8.7–10.3)
CHLORIDE SERPL-SCNC: 103 MMOL/L (ref 96–109)
CO2 SERPL-SCNC: 27.2 MMOL/L (ref 21.6–31.8)
ERYTHROCYTE [DISTWIDTH] IN BLOOD BY AUTOMATED COUNT: 4.12 M/UL (ref 3.8–5.4)
ERYTHROCYTE [DISTWIDTH] IN BLOOD: 14.6 % (ref 11.5–15.5)
GLOBULIN SER CALC-MCNC: 2.9 G/DL (ref 2.1–3.7)
GLUCOSE SERPL-MCNC: 88 MG/DL (ref 70–110)
HBA1C MFR BLD: 5.4 % (ref 4–6)
HCT VFR BLD AUTO: 36.9 % (ref 34–46)
HGB BLD-MCNC: 11.8 GM/DL (ref 11.4–16)
MCH RBC QN AUTO: 28.6 PG (ref 25–35)
MCHC RBC AUTO-ENTMCNC: 31.9 G/DL (ref 31–37)
MCV RBC AUTO: 89.5 FL (ref 80–100)
PLATELET # BLD AUTO: 309 K/UL (ref 150–450)
POTASSIUM SERPL-SCNC: 4.4 MMOL/L (ref 3.5–5.5)
PROT SERPL-MCNC: 7.4 G/DL (ref 6.2–8.2)
SODIUM SERPL-SCNC: 137 MMOL/L (ref 135–145)
VIT B12 SERPL-MCNC: 484 PG/ML (ref 200–944)
WBC # BLD AUTO: 7.5 K/UL (ref 3.8–10.6)

## 2018-11-12 PROCEDURE — 82607 VITAMIN B-12: CPT

## 2018-11-12 PROCEDURE — 84425 ASSAY OF VITAMIN B-1: CPT

## 2018-11-12 PROCEDURE — 82306 VITAMIN D 25 HYDROXY: CPT

## 2018-11-12 PROCEDURE — 83036 HEMOGLOBIN GLYCOSYLATED A1C: CPT

## 2018-11-12 PROCEDURE — 84443 ASSAY THYROID STIM HORMONE: CPT

## 2018-11-12 PROCEDURE — 85027 COMPLETE CBC AUTOMATED: CPT

## 2018-11-12 PROCEDURE — 80053 COMPREHEN METABOLIC PANEL: CPT

## 2018-11-12 PROCEDURE — 36415 COLL VENOUS BLD VENIPUNCTURE: CPT

## 2018-11-12 PROCEDURE — 93005 ELECTROCARDIOGRAM TRACING: CPT

## 2018-11-16 ENCOUNTER — HOSPITAL ENCOUNTER (OUTPATIENT)
Dept: HOSPITAL 47 - ORWHC2ENDO | Age: 28
Discharge: HOME | End: 2018-11-16
Attending: SURGERY
Payer: COMMERCIAL

## 2018-11-16 VITALS — HEART RATE: 71 BPM | DIASTOLIC BLOOD PRESSURE: 77 MMHG | SYSTOLIC BLOOD PRESSURE: 111 MMHG

## 2018-11-16 VITALS — RESPIRATION RATE: 16 BRPM | TEMPERATURE: 97.9 F

## 2018-11-16 VITALS — BODY MASS INDEX: 45.2 KG/M2

## 2018-11-16 DIAGNOSIS — Z88.1: ICD-10-CM

## 2018-11-16 DIAGNOSIS — E66.01: ICD-10-CM

## 2018-11-16 DIAGNOSIS — K29.50: Primary | ICD-10-CM

## 2018-11-16 DIAGNOSIS — K21.9: ICD-10-CM

## 2018-11-16 PROCEDURE — 88305 TISSUE EXAM BY PATHOLOGIST: CPT

## 2018-11-16 PROCEDURE — 81025 URINE PREGNANCY TEST: CPT

## 2018-11-16 PROCEDURE — 43239 EGD BIOPSY SINGLE/MULTIPLE: CPT

## 2018-11-16 NOTE — P.OP
Date of Procedure: 11/16/18


Preoperative Diagnosis: 


GERD





Morbid obesity


Postoperative Diagnosis: 


Mild antral gastritis


Procedure(s) Performed: 


EGD


Anesthesia: MAC


Surgeon: Gerry Garcia


Pathology: other (Antrum)


Condition: stable


Disposition: PACU


Description of Procedure: 


The patient's placed on the endoscopy table in the lateral position.  She 

received IV sedation.  The gastroscope placed oropharynx and passed in the 

esophagus and stomach.  Scope was then placed through the pylorus.  The first 

and second portion of the duodenum appeared normal.  Scope was then brought 

back the antrum and this appeared mildly inflamed.  A biopsies performed.  The 

scope was then retroflexed and the remainder stomach appeared normal.  There is 

no evidence of any significant hiatal hernia.  The GE junction was at 47 is.  

The distal esophagus appeared mildly inflamed and a biopsies performed.  The 

proximal esophagus appeared normal.  Scope was withdrawn for patient.

## 2018-11-16 NOTE — P.GSHP
History of Present Illness


H&P Date: 18


Chief Complaint: GERD, obesity





This a 28-year-old female undergoing workup for sleeve gastrectomy.  The patient

's had some mild issues with GERD.  Her BMI is 45.





Past Medical History


Past Medical History: No Reported History


Additional Past Medical History / Comment(s): LOWER BACK PAIN,


History of Any Multi-Drug Resistant Organisms: None Reported


Past Surgical History:  Section


Past Anesthesia/Blood Transfusion Reactions: No Reported Reaction


Smoking Status: Never smoker





- Past Family History


  ** Mother


Family Medical History: No Reported History





  ** Father


Family Medical History: Hypertension





Medications and Allergies


 Home Medications











 Medication  Instructions  Recorded  Confirmed  Type


 


No Known Home Medications  18 History











 Allergies











Allergy/AdvReac Type Severity Reaction Status Date / Time


 


clarithromycin [From Biaxin] Allergy Intermediate Swelling Verified 18 10:

49














Surgical - Exam


 Vital Signs











Temp Pulse Resp BP Pulse Ox


 


 97.9 F   81   16   127/60   100 


 


 18 10:50  18 10:50  18 10:50  18 10:50  18 10:50














- General


well developed, no distress





- Eyes


PERRL





- ENT


normal pinna





- Neck


no masses





- Respiratory


normal expansion





- Cardiovascular


Rhythm: regular





- Abdomen


Abdomen: soft, non tender





Assessment and Plan


Assessment: 





GERD, obesity.  We'll perform EGD.

## 2020-06-01 ENCOUNTER — HOSPITAL ENCOUNTER (OUTPATIENT)
Dept: HOSPITAL 47 - BARWHC3 | Age: 30
Discharge: HOME | End: 2020-06-01
Attending: SURGERY
Payer: COMMERCIAL

## 2020-06-01 VITALS
HEART RATE: 89 BPM | RESPIRATION RATE: 20 BRPM | SYSTOLIC BLOOD PRESSURE: 109 MMHG | DIASTOLIC BLOOD PRESSURE: 79 MMHG | TEMPERATURE: 98.3 F

## 2020-06-01 VITALS — BODY MASS INDEX: 43.9 KG/M2

## 2020-06-01 DIAGNOSIS — E66.01: Primary | ICD-10-CM

## 2020-06-01 DIAGNOSIS — M54.5: ICD-10-CM

## 2020-06-01 DIAGNOSIS — R51: ICD-10-CM

## 2020-06-01 PROCEDURE — 99211 OFF/OP EST MAY X REQ PHY/QHP: CPT

## 2020-06-01 NOTE — P.HPBAR
Bariatric H&P





- History & Physicial


H&P Date: 20


History & Physicial: 


Visit/CC: pre surg visit to see where she's at with requirements





Patient initial contact: 





Initial weight: 122.47 kg


Initial weight in pounds: 270.00


Height: 5 ft 6 in


Initial BMI: 43.5





Last weight: 





Current weight: 123.468 kg


Current weight in pounds: 272.20


Current BMI: 43.9





Ideal body weight (based on NIH guidelines): 58.967 kg





Excess body weight loss: 








The patient is a 29 year-old F who presents for Bariatric Assessment.  Patient 

presents today for bariatric consultation.  She is almost finished her 6 months 

of position directed weight loss visits.  Patient's morbid obesity BMI 44.  

We'll in discussion regarding sleeve gastrectomy 1 over the risks and benefits 

of the procedure.














Past Medical History


Past Medical History: No Reported History


Additional Past Medical History / Comment(s): FREQUENT HEADACHES, LOWER BACK 

PAIN,  STATES HAVING DIARRHEA , NAUSEA AND STOMACH PAIN., STATES CURRENT "COLD" 

WITH COUGH- PT TO NOTIFY DR LUKAS MIRZA.


History of Any Multi-Drug Resistant Organisms: None Reported


Past Surgical History:  Section


Past Anesthesia/Blood Transfusion Reactions: No Reported Reaction


Smoking Status: Never smoker





- Past Family History


  ** Mother


Family Medical History: No Reported History





  ** Father


Family Medical History: Hypertension





Surgical - Exam


                                   Vital Signs











Temp Pulse Resp BP


 


 98.3 F   89   20   109/79 


 


 20 14:30  20 14:30  20 14:30  20 14:30














- General


well developed, well nourished, no distress





- Eyes


PERRL





- ENT


normal pinna





- Neck


no masses





- Respiratory


normal expansion





- Cardiovascular


Rhythm: regular





- Abdomen


Abdomen: soft, non tender





Bariatric Assessment & Plan


Plan: 





RBC, BMI 44.  Patient will follow-up in one month.  Once her insurance 

requirements have been met she'll be preauthorize for sleeve gastrectomy.





Bariatric Checklist


Checklist: 


Plan: 





Checklist: 





EGD: 


1. Hiatal hernia: 


2. H. Pylori: 





HgbA1c: 





Vitamin D: 





Smoking: Never smoker





Primary care physician referral: Dr. gerardo Brown ([facility])





Psychiatry clearance: 





Cardiology clearance: 





Sleep study: 





Diet journal: 





VTE risk score: 





VTE risk level: 





Rehab needs at discharge:

## 2020-07-13 ENCOUNTER — HOSPITAL ENCOUNTER (OUTPATIENT)
Dept: HOSPITAL 47 - BARWHC3 | Age: 30
Discharge: HOME | End: 2020-07-13
Attending: SURGERY
Payer: COMMERCIAL

## 2020-07-13 VITALS — BODY MASS INDEX: 43 KG/M2

## 2020-07-13 DIAGNOSIS — Z71.3: ICD-10-CM

## 2020-07-13 DIAGNOSIS — E66.01: Primary | ICD-10-CM

## 2020-07-13 PROCEDURE — 97804 MEDICAL NUTRITION GROUP: CPT

## 2020-07-27 NOTE — P.HPBAR
Bariatric H&P





- History & Physicial


H&P Date: 20


History & Physicial: 


Visit/CC: 





Patient initial contact: 





Initial weight: 122.47 kg


Initial weight in pounds: 


Height: 5 ft 6 in


Initial BMI: 





Last weight: 





Current weight: 120.928 kg


Current weight in pounds: 


Current BMI: 43.0





Ideal body weight (based on NIH guidelines): 58.967 kg





Excess body weight loss: 








The patient is a 29 year-old F who presents for Bariatric Assessment.  Patient 

presents today for presurgical consultation.  She has had lifetime problems 

obesity.  Patient is morbidly obese.  Her BMI is 43.














Past Medical History


Past Medical History: No Reported History


Additional Past Medical History / Comment(s): FREQUENT HEADACHES, LOWER BACK 

PAIN,  STATES HAVING DIARRHEA , NAUSEA AND STOMACH PAIN., STATES CURRENT "COLD" 

WITH COUGH- PT TO NOTIFY DR LUKAS MIRZA.


History of Any Multi-Drug Resistant Organisms: None Reported


Past Surgical History:  Section


Past Anesthesia/Blood Transfusion Reactions: No Reported Reaction


Past Psychological History: Anxiety, Depression


Past Alcohol Use History: Occasional


Past Drug Use History: None Reported





- Past Family History


  ** Mother


Family Medical History: No Reported History





  ** Father


Family Medical History: Hypertension





Surgical - Exam





- General


well developed, well nourished, no distress





- Eyes


PERRL





- ENT


normal pinna





- Neck


no masses





- Respiratory


normal expansion





- Cardiovascular


Rhythm: regular





- Abdomen


Abdomen: soft, non tender





Bariatric Assessment & Plan


Plan: 





Morbid obesity.  Patient had lifetime problems..  She is an excellent 

understanding sleeve history.  We went over the risks and benefits of procedure 

including gastric staple line disruption, bleeding and scarring.





Bariatric Checklist


Checklist: 


Plan: 





Checklist: 





EGD: 


1. Hiatal hernia: 


2. H. Pylori: 





HgbA1c: 





Vitamin D: 





Smoking: Never smoker





Primary care physician referral: Dr. gerardo Brown ([facility])





Psychiatry clearance: 





Cardiology clearance: 





Sleep study: 





Diet journal: 





VTE risk score: 





VTE risk level: 





Rehab needs at discharge:

## 2020-08-24 ENCOUNTER — HOSPITAL ENCOUNTER (OUTPATIENT)
Dept: HOSPITAL 47 - BARWHC3 | Age: 30
Discharge: HOME | End: 2020-08-24
Attending: SURGERY
Payer: COMMERCIAL

## 2020-08-24 VITALS
HEART RATE: 74 BPM | RESPIRATION RATE: 18 BRPM | SYSTOLIC BLOOD PRESSURE: 140 MMHG | DIASTOLIC BLOOD PRESSURE: 84 MMHG | TEMPERATURE: 98.8 F

## 2020-08-24 VITALS — BODY MASS INDEX: 42.4 KG/M2

## 2020-08-24 DIAGNOSIS — E66.01: Primary | ICD-10-CM

## 2020-08-24 PROCEDURE — 99211 OFF/OP EST MAY X REQ PHY/QHP: CPT

## 2020-08-24 NOTE — P.HPBAR
Bariatric H&P





- History & Physicial


H&P Date: 20


History & Physicial: 


Visit/CC: new patient; sleeve surgery





Patient initial contact: 





Initial weight: 122.47 kg


Initial weight in pounds: 270.00


Height: 5 ft 6 in


Initial BMI: 43.5





Last weight: 





Current weight: 119.295 kg


Current weight in pounds: 263.00


Current BMI: 42.4





Ideal body weight (based on NIH guidelines): 58.967 kg





Excess body weight loss: 5.0%








The patient is a 29 year-old F who presents for Bariatric Assessment.  Patient 

presents today for presurgical consultation.  Her BMI is 42.  She's had lifetime

problems obesity.  She is developed comorbidities related to morbid obesity.














Past Medical History


Past Medical History: No Reported History


Additional Past Medical History / Comment(s): FREQUENT HEADACHES, LOWER BACK 

PAIN,  STATES HAVING DIARRHEA , NAUSEA AND STOMACH PAIN., STATES CURRENT "COLD" 

WITH COUGH- PT TO NOTIFY DR LUKAS MIRZA.


History of Any Multi-Drug Resistant Organisms: None Reported


Past Surgical History:  Section


Past Anesthesia/Blood Transfusion Reactions: No Reported Reaction


Past Psychological History: Anxiety, Depression


Smoking Status: Never smoker


Past Alcohol Use History: Occasional


Past Drug Use History: None Reported





- Past Family History


  ** Mother


Family Medical History: No Reported History





  ** Father


Family Medical History: Hypertension





Surgical - Exam


                                   Vital Signs











Temp Pulse Resp BP Pulse Ox


 


 98.8 F   74   18   140/84   97 


 


 20 14:05  20 14:05  20 14:05  20 14:05  20 14:05














- General


well developed, well nourished, no distress





- Eyes


PERRL





- ENT


normal pinna





- Neck


no masses





- Respiratory


normal expansion





- Cardiovascular


Rhythm: regular





- Abdomen


Abdomen: soft, non tender





Bariatric Assessment & Plan


Plan: 





Morbid obesity.  Patient has an excellent understanding of sleeve gastrectomy.  

We went over the risks and benefits of procedure including conversion to the 

open procedure and injury to the stomach liver spleen and issues gastric staple 

line such as bleeding scarring obstruction.  The patient will follow up after 

EGD is performed.





Bariatric Checklist


Checklist: 


Plan: 





Checklist: 





EGD: 


1. Hiatal hernia: 


2. H. Pylori: 





HgbA1c: 





Vitamin D: 





Smoking: Never smoker





Primary care physician referral: Dr. gerardo Brown ([facility])





Psychiatry clearance: 





Cardiology clearance: 





Sleep study: 





Diet journal: 





VTE risk score: 





VTE risk level: 





Rehab needs at discharge:

## 2020-09-21 ENCOUNTER — HOSPITAL ENCOUNTER (OUTPATIENT)
Dept: HOSPITAL 47 - LABPAT | Age: 30
Discharge: HOME | End: 2020-09-21
Attending: SURGERY
Payer: COMMERCIAL

## 2020-09-21 DIAGNOSIS — Z01.818: Primary | ICD-10-CM

## 2020-09-21 LAB
ALBUMIN SERPL-MCNC: 4.5 G/DL (ref 3.5–5)
ALP SERPL-CCNC: 59 U/L (ref 38–126)
ALT SERPL-CCNC: 14 U/L (ref 4–34)
ANION GAP SERPL CALC-SCNC: 8 MMOL/L
AST SERPL-CCNC: 21 U/L (ref 14–36)
BASOPHILS # BLD AUTO: 0 K/UL (ref 0–0.2)
BASOPHILS NFR BLD AUTO: 0 %
BUN SERPL-SCNC: 19 MG/DL (ref 7–17)
CALCIUM SPEC-MCNC: 9.6 MG/DL (ref 8.4–10.2)
CHLORIDE SERPL-SCNC: 102 MMOL/L (ref 98–107)
CO2 SERPL-SCNC: 27 MMOL/L (ref 22–30)
EOSINOPHIL # BLD AUTO: 0.2 K/UL (ref 0–0.7)
EOSINOPHIL NFR BLD AUTO: 2 %
ERYTHROCYTE [DISTWIDTH] IN BLOOD BY AUTOMATED COUNT: 4.28 M/UL (ref 3.8–5.4)
ERYTHROCYTE [DISTWIDTH] IN BLOOD: 13.9 % (ref 11.5–15.5)
GLUCOSE SERPL-MCNC: 81 MG/DL (ref 74–99)
HCT VFR BLD AUTO: 38.4 % (ref 34–46)
HGB BLD-MCNC: 12.3 GM/DL (ref 11.4–16)
LYMPHOCYTES # SPEC AUTO: 2.3 K/UL (ref 1–4.8)
LYMPHOCYTES NFR SPEC AUTO: 27 %
MCH RBC QN AUTO: 28.7 PG (ref 25–35)
MCHC RBC AUTO-ENTMCNC: 32 G/DL (ref 31–37)
MCV RBC AUTO: 89.8 FL (ref 80–100)
MONOCYTES # BLD AUTO: 0.4 K/UL (ref 0–1)
MONOCYTES NFR BLD AUTO: 5 %
NEUTROPHILS # BLD AUTO: 5.5 K/UL (ref 1.3–7.7)
NEUTROPHILS NFR BLD AUTO: 65 %
PLATELET # BLD AUTO: 301 K/UL (ref 150–450)
POTASSIUM SERPL-SCNC: 4.2 MMOL/L (ref 3.5–5.1)
PROT SERPL-MCNC: 8.2 G/DL (ref 6.3–8.2)
SODIUM SERPL-SCNC: 137 MMOL/L (ref 137–145)
WBC # BLD AUTO: 8.5 K/UL (ref 3.8–10.6)

## 2020-09-21 PROCEDURE — 85025 COMPLETE CBC W/AUTO DIFF WBC: CPT

## 2020-09-21 PROCEDURE — 36415 COLL VENOUS BLD VENIPUNCTURE: CPT

## 2020-09-21 PROCEDURE — 80053 COMPREHEN METABOLIC PANEL: CPT

## 2020-09-30 ENCOUNTER — HOSPITAL ENCOUNTER (INPATIENT)
Dept: HOSPITAL 47 - 2ORMAIN | Age: 30
LOS: 2 days | Discharge: HOME | DRG: 621 | End: 2020-10-02
Attending: SURGERY | Admitting: SURGERY
Payer: COMMERCIAL

## 2020-09-30 VITALS — BODY MASS INDEX: 40.1 KG/M2

## 2020-09-30 DIAGNOSIS — Z88.0: ICD-10-CM

## 2020-09-30 DIAGNOSIS — Z86.59: ICD-10-CM

## 2020-09-30 DIAGNOSIS — M54.5: ICD-10-CM

## 2020-09-30 DIAGNOSIS — Z88.1: ICD-10-CM

## 2020-09-30 DIAGNOSIS — E66.01: Primary | ICD-10-CM

## 2020-09-30 DIAGNOSIS — Z79.3: ICD-10-CM

## 2020-09-30 DIAGNOSIS — Z82.49: ICD-10-CM

## 2020-09-30 DIAGNOSIS — E83.42: ICD-10-CM

## 2020-09-30 DIAGNOSIS — Z87.19: ICD-10-CM

## 2020-09-30 DIAGNOSIS — R51.9: ICD-10-CM

## 2020-09-30 DIAGNOSIS — Z98.891: ICD-10-CM

## 2020-09-30 DIAGNOSIS — K59.89: ICD-10-CM

## 2020-09-30 PROCEDURE — 84100 ASSAY OF PHOSPHORUS: CPT

## 2020-09-30 PROCEDURE — 74240 X-RAY XM UPR GI TRC 1CNTRST: CPT

## 2020-09-30 PROCEDURE — 88307 TISSUE EXAM BY PATHOLOGIST: CPT

## 2020-09-30 PROCEDURE — 94640 AIRWAY INHALATION TREATMENT: CPT

## 2020-09-30 PROCEDURE — 71045 X-RAY EXAM CHEST 1 VIEW: CPT

## 2020-09-30 PROCEDURE — 84520 ASSAY OF UREA NITROGEN: CPT

## 2020-09-30 PROCEDURE — 0DB64Z3 EXCISION OF STOMACH, PERCUTANEOUS ENDOSCOPIC APPROACH, VERTICAL: ICD-10-PCS

## 2020-09-30 PROCEDURE — 80048 BASIC METABOLIC PNL TOTAL CA: CPT

## 2020-09-30 PROCEDURE — 85025 COMPLETE CBC W/AUTO DIFF WBC: CPT

## 2020-09-30 PROCEDURE — 80051 ELECTROLYTE PANEL: CPT

## 2020-09-30 PROCEDURE — 83735 ASSAY OF MAGNESIUM: CPT

## 2020-09-30 PROCEDURE — 82310 ASSAY OF CALCIUM: CPT

## 2020-09-30 PROCEDURE — 82565 ASSAY OF CREATININE: CPT

## 2020-09-30 RX ADMIN — DEXTROSE ONE MG: 50 INJECTION, SOLUTION INTRAVENOUS at 13:20

## 2020-09-30 RX ADMIN — DIPHENHYDRAMINE HYDROCHLORIDE ONE MG: 50 INJECTION, SOLUTION INTRAMUSCULAR; INTRAVENOUS at 16:07

## 2020-09-30 RX ADMIN — THIAMINE HYDROCHLORIDE SCH: 100 INJECTION, SOLUTION INTRAMUSCULAR; INTRAVENOUS at 16:54

## 2020-09-30 RX ADMIN — KETOROLAC TROMETHAMINE SCH MG: 15 INJECTION, SOLUTION INTRAMUSCULAR; INTRAVENOUS at 17:48

## 2020-09-30 RX ADMIN — ENOXAPARIN SODIUM ONE MG: 40 INJECTION SUBCUTANEOUS at 12:53

## 2020-09-30 RX ADMIN — ISODIUM CHLORIDE SCH: 0.03 SOLUTION RESPIRATORY (INHALATION) at 16:21

## 2020-09-30 RX ADMIN — PROMETHAZINE HYDROCHLORIDE ONE MG: 25 INJECTION INTRAMUSCULAR; INTRAVENOUS at 15:21

## 2020-09-30 RX ADMIN — PROMETHAZINE HYDROCHLORIDE ONE MG: 25 INJECTION INTRAMUSCULAR; INTRAVENOUS at 15:44

## 2020-09-30 RX ADMIN — HYDROMORPHONE HYDROCHLORIDE PRN MG: 1 INJECTION, SOLUTION INTRAMUSCULAR; INTRAVENOUS; SUBCUTANEOUS at 19:51

## 2020-09-30 RX ADMIN — POTASSIUM CHLORIDE SCH: 14.9 INJECTION, SOLUTION INTRAVENOUS at 16:53

## 2020-09-30 RX ADMIN — DEXTROSE SCH MLS/HR: 50 INJECTION, SOLUTION INTRAVENOUS at 17:55

## 2020-09-30 RX ADMIN — ENOXAPARIN SODIUM ONE MG: 40 INJECTION SUBCUTANEOUS at 13:20

## 2020-09-30 RX ADMIN — DIPHENHYDRAMINE HYDROCHLORIDE ONE MG: 50 INJECTION, SOLUTION INTRAMUSCULAR; INTRAVENOUS at 16:18

## 2020-09-30 RX ADMIN — THIAMINE HYDROCHLORIDE SCH MLS/HR: 100 INJECTION, SOLUTION INTRAMUSCULAR; INTRAVENOUS at 17:11

## 2020-09-30 RX ADMIN — ISODIUM CHLORIDE SCH: 0.03 SOLUTION RESPIRATORY (INHALATION) at 19:13

## 2020-09-30 RX ADMIN — DEXTROSE ONE MG: 50 INJECTION, SOLUTION INTRAVENOUS at 12:53

## 2020-09-30 RX ADMIN — POTASSIUM CHLORIDE SCH MLS: 14.9 INJECTION, SOLUTION INTRAVENOUS at 12:52

## 2020-09-30 NOTE — P.OP
Date of Procedure: 09/30/20


Preoperative Diagnosis: 


Morbid obesity, BMI 40


Postoperative Diagnosis: 


Morbid obesity, BMI 40


Procedure(s) Performed: 


Laparoscopic sleeve gastrectomy


Anesthesia: STUART


Surgeon: Gerry Garcia


Estimated Blood Loss (ml): 5


Pathology: other (Stomach)


Condition: stable


Disposition: PACU


Description of Procedure: 


The patient was placed on the operating room table in the supine position.  She 

received general anesthesia and then was placed in dorsal lithotomy position.  

Her abdomen was prepped and draped in sterile fashion.  The skin incision sites 

were anesthetized 1% local Xylocaine.  And then the skin was incised with an 11 

blade in the left lateral position.  Using a blade less trocar under direct 

visualization the peritoneal cavity was entered.  The abdomen was insufflated 

and then a 5 mm laparoscope was placed into the peritoneal cavity.  A 5 mm 

trocar was placed in the right epigastric, and right lateral position.  A 15 mm 

trocar was placed in the supra-umbilical position and another 5 mm trocar was 

placed in the left lateral position.  The left lateral lobe of the liver was 

retracted.  The stomach was visualized.  The greater curvature of the stomach 

was then dissected using the Harmonic scissors.  The dissection occurred 

approximately 5 cm from the pylorus to the level of the left hortencia.  There was no

hiatal hernia seen.  At this point a 40-Liberian bougie dilator was placed the 

oropharynx and passed into the esophagus and into the stomach by the CRNA.  The 

sleeve gastrectomy was performed by using the powered echelon stapler with a 

seam guard buttress material.  Sequential firings of the stapler were performed.

 The gastric remnant was then brought out through the 15 mm trocar site.  The 

dilator was withdrawn.  And a orogastric tube was replaced into the stomach.  

The stomach was insufflated with 200 mL of methylene blue normal saline.  There 

was no evidence of extravasation.  The abdomen was irrigated there is no 

bleeding seen.  The Raf-Prasanna device was used to close the 15 mm trocar 

with 0 Vicryl.  Skin was closed with interrupted 3-0 Monocryl sutures once the 

trochars withdrawn.  Dermabond dressing was applied.  Patient was sent to 

recovery in stable condition.

## 2020-09-30 NOTE — P.GSHP
History of Present Illness


H&P Date: 20


Chief Complaint: Morbid obesity





The 30-year-old female presents today for laparoscopic sleeve gastrectomy.  

Patient has had complaints of obesity.  She has developed severe coronary 

disease.  Patient is well detailed on the procedure of laparoscopic sleeve 

gastrectomy.  She was sent the risks and benefits of procedure.





Past Medical History


Past Medical History: No Reported History


Additional Past Medical History / Comment(s): FREQUENT HEADACHES, LOWER BACK 

PAIN,  STATES HAVING DIARRHEA , NAUSEA AND STOMACH PAIN.


History of Any Multi-Drug Resistant Organisms: None Reported


Past Surgical History:  Section


Additional Past Surgical History / Comment(s): EGD


Past Anesthesia/Blood Transfusion Reactions: No Reported Reaction


Additional Past Anesthesia/Blood Transfusion Reaction / Comment(s): no hx blood 

transfusion


Past Psychological History: Anxiety, Depression


Smoking Status: Never smoker


Past Alcohol Use History: Occasional


Past Drug Use History: None Reported





- Past Family History


  ** Mother


Family Medical History: No Reported History





  ** Father


Family Medical History: Hypertension





Medications and Allergies


                                Home Medications











 Medication  Instructions  Recorded  Confirmed  Type


 


Medroxyprogesterone Acetate 150 mg IM Q90D 20 History





[Depo-Provera]    








                                    Allergies











Allergy/AdvReac Type Severity Reaction Status Date / Time


 


clarithromycin [From Biaxin] Allergy Intermediate Swelling Verified 20 

15:34


 


Penicillins Allergy  Unknown Verified 20 15:34





   Childhood  














Surgical - Exam





BMI 40





- General


well developed, well nourished, no distress





- Eyes


PERRL





- ENT


normal pinna





- Neck


no masses





- Respiratory


normal expansion





- Cardiovascular


Rhythm: regular





- Abdomen


Abdomen: soft, non tender





Assessment and Plan


Plan: 





Morbid obesity, BMI 40.  We'll perform laparoscopic sleeve gastrectomy.

## 2020-09-30 NOTE — XR
EXAMINATION TYPE: XR chest 1V portable

 

DATE OF EXAM: 9/30/2020

 

COMPARISON: Prior chest x-ray 3/2/2016

 

HISTORY: Status post central venous catheter placement

 

TECHNIQUE: Single frontal view of the chest is obtained.

 

FINDINGS:  There is been interval placement of a right jugular central venous catheter, distal tip is
 within the right atrium. There is no evident pneumothorax or pleural effusion. Lung volumes are low 
and the patient is rotated, there are scoliosis. Heart size may be accentuated by technique. Atelecta
tic changes are suspected bilaterally.

 

IMPRESSION:  No evident comp occasions and status post central venous catheter placement. Distal tip 
of the catheter within the right atrium.

## 2020-10-01 LAB
ANION GAP SERPL CALC-SCNC: 13.2 MMOL/L (ref 4–12)
BASOPHILS # BLD AUTO: 0 K/UL (ref 0–0.2)
BASOPHILS NFR BLD AUTO: 0 %
BUN SERPL-SCNC: 10 MG/DL (ref 9–27)
CALCIUM SPEC-MCNC: 8.7 MG/DL (ref 8.7–10.3)
CHLORIDE SERPL-SCNC: 107 MMOL/L (ref 96–109)
CO2 SERPL-SCNC: 16.8 MMOL/L (ref 21.6–31.8)
EOSINOPHIL # BLD AUTO: 0.1 K/UL (ref 0–0.7)
EOSINOPHIL NFR BLD AUTO: 1 %
ERYTHROCYTE [DISTWIDTH] IN BLOOD BY AUTOMATED COUNT: 4.14 M/UL (ref 3.8–5.4)
ERYTHROCYTE [DISTWIDTH] IN BLOOD: 14 % (ref 11.5–15.5)
HCT VFR BLD AUTO: 37.1 % (ref 34–46)
HGB BLD-MCNC: 11.9 GM/DL (ref 11.4–16)
LYMPHOCYTES # SPEC AUTO: 1.1 K/UL (ref 1–4.8)
LYMPHOCYTES NFR SPEC AUTO: 15 %
MAGNESIUM SPEC-SCNC: 1.8 MG/DL (ref 1.5–2.4)
MCH RBC QN AUTO: 28.6 PG (ref 25–35)
MCHC RBC AUTO-ENTMCNC: 31.9 G/DL (ref 31–37)
MCV RBC AUTO: 89.7 FL (ref 80–100)
MONOCYTES # BLD AUTO: 0.4 K/UL (ref 0–1)
MONOCYTES NFR BLD AUTO: 5 %
NEUTROPHILS # BLD AUTO: 6.1 K/UL (ref 1.3–7.7)
NEUTROPHILS NFR BLD AUTO: 79 %
PLATELET # BLD AUTO: 242 K/UL (ref 150–450)
POTASSIUM SERPL-SCNC: 5.2 MMOL/L (ref 3.5–5.5)
SODIUM SERPL-SCNC: 137 MMOL/L (ref 135–145)
WBC # BLD AUTO: 7.7 K/UL (ref 3.8–10.6)

## 2020-10-01 RX ADMIN — LEUCINE, PHENYLALANINE, LYSINE, METHIONINE, ISOLEUCINE, VALINE, HISTIDINE, THREONINE, TRYPTOPHAN, ALANINE, GLYCINE, ARGININE, PROLINE, SERINE, TYROSINE, SODIUM ACETATE, DIBASIC POTASSIUM PHOSPHATE, MAGNESIUM CHLORIDE, SODIUM CHLORIDE, CALCIUM CHLORIDE, DEXTROSE SCH MLS/HR
365; 280; 290; 200; 300; 290; 240; 210; 90; 1035; 515; 575; 340; 250; 20; 340; 261; 51; 59; 33; 15 INJECTION INTRAVENOUS at 08:07

## 2020-10-01 RX ADMIN — POTASSIUM CHLORIDE SCH: 14.9 INJECTION, SOLUTION INTRAVENOUS at 16:59

## 2020-10-01 RX ADMIN — ISODIUM CHLORIDE SCH MG: 0.03 SOLUTION RESPIRATORY (INHALATION) at 08:02

## 2020-10-01 RX ADMIN — KETOROLAC TROMETHAMINE SCH MG: 15 INJECTION, SOLUTION INTRAMUSCULAR; INTRAVENOUS at 05:31

## 2020-10-01 RX ADMIN — PANTOPRAZOLE SODIUM SCH MG: 40 INJECTION, POWDER, FOR SOLUTION INTRAVENOUS at 08:01

## 2020-10-01 RX ADMIN — THIAMINE HYDROCHLORIDE SCH MLS/HR: 100 INJECTION, SOLUTION INTRAMUSCULAR; INTRAVENOUS at 00:51

## 2020-10-01 RX ADMIN — HYDROMORPHONE HYDROCHLORIDE PRN MG: 1 INJECTION, SOLUTION INTRAMUSCULAR; INTRAVENOUS; SUBCUTANEOUS at 08:01

## 2020-10-01 RX ADMIN — KETOROLAC TROMETHAMINE SCH MG: 15 INJECTION, SOLUTION INTRAMUSCULAR; INTRAVENOUS at 17:59

## 2020-10-01 RX ADMIN — DEXTROSE SCH MLS/HR: 50 INJECTION, SOLUTION INTRAVENOUS at 00:51

## 2020-10-01 RX ADMIN — ISODIUM CHLORIDE SCH MG: 0.03 SOLUTION RESPIRATORY (INHALATION) at 15:24

## 2020-10-01 RX ADMIN — ENOXAPARIN SODIUM SCH MG: 40 INJECTION SUBCUTANEOUS at 08:02

## 2020-10-01 RX ADMIN — CEFAZOLIN SCH: 330 INJECTION, POWDER, FOR SOLUTION INTRAMUSCULAR; INTRAVENOUS at 23:04

## 2020-10-01 RX ADMIN — LEUCINE, PHENYLALANINE, LYSINE, METHIONINE, ISOLEUCINE, VALINE, HISTIDINE, THREONINE, TRYPTOPHAN, ALANINE, GLYCINE, ARGININE, PROLINE, SERINE, TYROSINE, SODIUM ACETATE, DIBASIC POTASSIUM PHOSPHATE, MAGNESIUM CHLORIDE, SODIUM CHLORIDE, CALCIUM CHLORIDE, DEXTROSE SCH MLS/HR
365; 280; 290; 200; 300; 290; 240; 210; 90; 1035; 515; 575; 340; 250; 20; 340; 261; 51; 59; 33; 15 INJECTION INTRAVENOUS at 18:01

## 2020-10-01 RX ADMIN — KETOROLAC TROMETHAMINE SCH MG: 15 INJECTION, SOLUTION INTRAMUSCULAR; INTRAVENOUS at 00:50

## 2020-10-01 RX ADMIN — KETOROLAC TROMETHAMINE SCH MG: 15 INJECTION, SOLUTION INTRAMUSCULAR; INTRAVENOUS at 12:54

## 2020-10-01 RX ADMIN — ISODIUM CHLORIDE SCH MG: 0.03 SOLUTION RESPIRATORY (INHALATION) at 12:08

## 2020-10-01 RX ADMIN — ISODIUM CHLORIDE SCH: 0.03 SOLUTION RESPIRATORY (INHALATION) at 19:14

## 2020-10-01 NOTE — P.PN
Subjective


Progress Note Date: 10/01/20





CHIEF COMPLAINT: Morbid obesity





HISTORY OF PRESENT ILLNESS: Patient seen and examined with Dr. Garcia.  

Patient is status post laparoscopic sleeve gastrectomy.  Patient reports 

abdominal pain.  Denies any nausea or vomiting.  Denies any flatus or bowel 

movement.  She is burping.  She's afebrile.  WBC 7.7 hemoglobin 11.9 magnesium 

1.8





Upper GI shows postsurgical change of gastric sleeve with edema noted at the 

proximal LAD gastric sleep component and mild obstruction.  No evidence of leak.





PHYSICAL EXAM: 


VITAL SIGNS: Reviewed.


GENERAL: Well-developed in no acute distress. 


HEENT:  No sclera icterus. Extraocular movements grossly intact.  Moist buccal 

mucosa. Head is atraumatic, normocephalic. 


ABDOMEN:  Soft.  Nondistended. 


NEUROLOGIC: Alert and oriented. Cranial nerves II through XII grossly intact.





ASSESSMENT: 


1.  Morbid obesity status post laparoscopic sleeve gastrectomy postop day #1


2.  Hypomagnesemia





PLAN: 


-Start patient on a bariatric clear diet


-Continue IV fluids


-Replace magnesium


-Continue pain control


-Continue antiemetics


-Continue pulmonary toileting with incentive spirometer


-Encourage patient to ambulate


-GI prophylaxis Protonix and DVT prophylaxis Lovenox





Physician Assistant note has been reviewed by physician. Signing provider agrees

with the documented findings, assessment, and plan of care. 





Objective





- Vital Signs


Vital signs: 


                                   Vital Signs











Temp  98.8 F   10/01/20 07:00


 


Pulse  68   10/01/20 12:17


 


Resp  16   09/30/20 16:46


 


BP  114/72   10/01/20 07:00


 


Pulse Ox  99   10/01/20 08:05








                                 Intake & Output











 09/30/20 10/01/20 10/01/20





 18:59 06:59 18:59


 


Intake Total 1500  


 


Output Total 15  


 


Balance 1485  


 


Weight 112.9 kg  


 


Intake:   


 


  IV 1500  


 


Output:   


 


  Estimated Blood Loss 15  


 


Other:   


 


  # Voids  2 














- Labs


CBC & Chem 7: 


                                 10/01/20 06:23





                                 10/01/20 06:23


Labs: 


                  Abnormal Lab Results - Last 24 Hours (Table)











  10/01/20 Range/Units





  06:23 


 


Carbon Dioxide  16.8 L  (21.6-31.8)  mmol/L


 


Anion Gap  13.20 H  (4.00-12.00)  mmol/L

## 2020-10-01 NOTE — FL
EXAMINATION TYPE: FL UGI

 

DATE OF EXAM: 10/1/2020

 

CLINICAL HISTORY:  Status post gastric sleeve

 

Contrast: Omnipaque 350 50 mL

 

The patient ingested contrast without difficulty or delay.  Noted are postsurgical changes of gastric
 sleeve . There is edema at the proximal gastric sleeve with the mild delay in contrast transit. Cont
rast does extend beyond the area of edema and into the proximal duodenum. No evidence for leak.

 

IMPRESSION: Post-surgical change of  gastric sleeve with edema noted at the proximal LAD gastric slee
ve component and  mild obstruction.

## 2020-10-01 NOTE — P.CONS
History of Present Illness





- Reason for Consult


Consult date: 10/01/20


Anxiety,


Requesting physician: Gerry Garcia





- Chief Complaint


Morbid obesity, status post laparoscopic sleeve gastrectomy





- History of Present Illness


This is a 30-year-old female, morbidly obese, BMI 40.2, status post laparoscopic

sleeve gastrectomy.  Tolerated procedure well.  Ambulating with in room, 

Burping, no flatus, no bowel movement.  NPO,scheduled for upper GI a this AM.  

Denies chest pain, palpitations or shortness of breath.  Pain controlled.  

Afebrile.








Review of Systems


Constitutional: Denied any fever or chills.


Cardio vascular: denied any chest pain, palpitations


Gastrointestinal denied any nausea vomiting


Pulmonary: Denied any shortness of breath cough


Neurologic denied any new focal deficits








ROS Statement: 


Those systems with pertinent positive or pertinent negative responses have been 

documented in the HPI.








ROS Other: All systems not noted in ROS Statement are negative.








Past Medical History


Past Medical History: No Reported History


Additional Past Medical History / Comment(s): FREQUENT HEADACHES, LOWER BACK 

PAIN,  STATES HAVING DIARRHEA , NAUSEA AND STOMACH PAIN.


History of Any Multi-Drug Resistant Organisms: None Reported


Past Surgical History:  Section


Additional Past Surgical History / Comment(s): EGD


Past Anesthesia/Blood Transfusion Reactions: No Reported Reaction


Additional Past Anesthesia/Blood Transfusion Reaction / Comm: no hx blood 

transfusion


Smoking Status: Never smoker





- Past Family History


  ** Mother


Family Medical History: No Reported History





  ** Father


Family Medical History: Hypertension





Medications and Allergies


                                Home Medications











 Medication  Instructions  Recorded  Confirmed  Type


 


Medroxyprogesterone Acetate 150 mg IM Q90D 20 History





[Depo-Provera]    








                                    Allergies











Allergy/AdvReac Type Severity Reaction Status Date / Time


 


clarithromycin [From Biaxin] Allergy Intermediate Swelling Verified 20 

15:34


 


Penicillins Allergy  Unknown Verified 20 15:34





   Childhood  














Physical Exam


Vitals: 


                                   Vital Signs











  Temp Pulse Pulse Resp BP Pulse Ox


 


 10/01/20 08:15   72    


 


 10/01/20 08:05       99


 


 10/01/20 08:02   70    


 


 10/01/20 07:00  98.8 F   63   114/72  100


 


 10/01/20 01:30  97.7 F   84   129/84  98


 


 20 23:48       100


 


 20 19:07  98.2 F   76   133/83  100


 


 20 18:52    83   130/83  99


 


 20 18:37    74   136/81  20 18:22    73   134/83  20 18:19    72   138/83  20 17:52    73   134/85  20 17:37    71   137/84  20 17:22    63   135/83  20 17:19       99


 


 20 17:07  98.1 F   67   130/81  20 16:46    69  16  144/81  20 16:30    71  16  140/75  20 16:15    65  16  141/75  20 16:00    67  16  140/76  20 15:45    70  16  141/79  20 15:30    69  16  139/71  20 15:15    70  16  129/73  20 15:06  96.8 F L   75  14  132/73  20 12:20  97.9 F   89  20  118/79  97








                                Intake and Output











 09/30/20 10/01/20 10/01/20





 22:59 06:59 14:59


 


Intake Total 700  


 


Balance 700  


 


Intake:   


 


    


 


Other:   


 


  # Voids  2 


 


  Weight 112.9 kg  











PHYSICAL EXAM:


VITAL SIGNS: As above


GENERAL: Sitting up in bed, no acute distress


HEENT:  Conjunctivae normal. eyes normal. 


NECK:  No JVD. No thyroid enlargement. No LNs


CARDIOVASCULAR:  S1, S2 regular.. No murmur


RESPIRATION: Breath sounds diminished in the bases. No rhonchi or crackles. No 

bronchial breathing.


ABDOMEN:  Soft, status post surgery.  Laparoscopic sites clean and dry. No 

guarding.Bowel sounds heard.


LEGS:  No edema. no swelling.  No calf tenderness


PSYCHIATRY: Alert and oriented X3, mood and affect normal.


NERVOUS SYSTEM:  Cranial N 2-12 grossly normal. Moves all 4 limbs.  No focal 

deficits.  Strength and sensation grossly intact..


Skin: Warm and dry, no rash 


Lymphatic system. No LN neck axilla.











Results


CBC & Chem 7: 


                                 10/01/20 06:23





                                 10/01/20 06:23


Labs: 


                  Abnormal Lab Results - Last 24 Hours (Table)











  10/01/20 Range/Units





  06:23 


 


Carbon Dioxide  16.8 L  (21.6-31.8)  mmol/L


 


Anion Gap  13.20 H  (4.00-12.00)  mmol/L














Assessment and Plan


Assessment: 


Morbid obesity, BMI 40.2, status post laparoscopic sleeve gastrectomy


History of anxiety


History of depression

















Plan: Continue on current medication regime ,monitoring and symptomatic tr

eatment. Increase ambulation as tolerated.  Aggressive pulmonary toileting with 

incentive spirometer reinforced.  Upper GI pending.  Thank you Dr. Garcia for 

the consult.











The impression and plan of care has been dictated as directed.





:


I performed a history and examination of this patient,  discussed the same with 

the dictator.  I agree with the dictator's note ,documented as a scribe.  Any 

additional findings or plans will be noted.

## 2020-10-02 VITALS — RESPIRATION RATE: 16 BRPM

## 2020-10-02 VITALS — SYSTOLIC BLOOD PRESSURE: 116 MMHG | DIASTOLIC BLOOD PRESSURE: 77 MMHG | TEMPERATURE: 98.2 F

## 2020-10-02 VITALS — HEART RATE: 71 BPM

## 2020-10-02 LAB
ANION GAP SERPL CALC-SCNC: 8.9 MMOL/L (ref 4–12)
BASOPHILS # BLD AUTO: 0 K/UL (ref 0–0.2)
BASOPHILS NFR BLD AUTO: 0 %
BUN SERPL-SCNC: 10 MG/DL (ref 9–27)
BUN/CREAT SERPL: 16.67 RATIO (ref 12–20)
CALCIUM SPEC-MCNC: 8.6 MG/DL (ref 8.7–10.3)
CHLORIDE SERPL-SCNC: 110 MMOL/L (ref 96–109)
CO2 SERPL-SCNC: 21.1 MMOL/L (ref 21.6–31.8)
EOSINOPHIL # BLD AUTO: 0.1 K/UL (ref 0–0.7)
EOSINOPHIL NFR BLD AUTO: 1 %
ERYTHROCYTE [DISTWIDTH] IN BLOOD BY AUTOMATED COUNT: 3.86 M/UL (ref 3.8–5.4)
ERYTHROCYTE [DISTWIDTH] IN BLOOD: 14.1 % (ref 11.5–15.5)
GLUCOSE SERPL-MCNC: 77 MG/DL (ref 70–110)
HCT VFR BLD AUTO: 35 % (ref 34–46)
HGB BLD-MCNC: 11.1 GM/DL (ref 11.4–16)
LYMPHOCYTES # SPEC AUTO: 1.3 K/UL (ref 1–4.8)
LYMPHOCYTES NFR SPEC AUTO: 23 %
MAGNESIUM SPEC-SCNC: 1.9 MG/DL (ref 1.5–2.4)
MCH RBC QN AUTO: 28.7 PG (ref 25–35)
MCHC RBC AUTO-ENTMCNC: 31.7 G/DL (ref 31–37)
MCV RBC AUTO: 90.6 FL (ref 80–100)
MONOCYTES # BLD AUTO: 0.3 K/UL (ref 0–1)
MONOCYTES NFR BLD AUTO: 6 %
NEUTROPHILS # BLD AUTO: 4 K/UL (ref 1.3–7.7)
NEUTROPHILS NFR BLD AUTO: 69 %
PLATELET # BLD AUTO: 209 K/UL (ref 150–450)
POTASSIUM SERPL-SCNC: 4.3 MMOL/L (ref 3.5–5.5)
SODIUM SERPL-SCNC: 140 MMOL/L (ref 135–145)
WBC # BLD AUTO: 5.8 K/UL (ref 3.8–10.6)

## 2020-10-02 RX ADMIN — CEFAZOLIN SCH MLS/HR: 330 INJECTION, POWDER, FOR SOLUTION INTRAMUSCULAR; INTRAVENOUS at 00:24

## 2020-10-02 RX ADMIN — KETOROLAC TROMETHAMINE SCH MG: 15 INJECTION, SOLUTION INTRAMUSCULAR; INTRAVENOUS at 06:11

## 2020-10-02 RX ADMIN — POTASSIUM CHLORIDE SCH: 14.9 INJECTION, SOLUTION INTRAVENOUS at 16:43

## 2020-10-02 RX ADMIN — KETOROLAC TROMETHAMINE SCH MG: 15 INJECTION, SOLUTION INTRAMUSCULAR; INTRAVENOUS at 00:22

## 2020-10-02 RX ADMIN — KETOROLAC TROMETHAMINE SCH MG: 15 INJECTION, SOLUTION INTRAMUSCULAR; INTRAVENOUS at 13:31

## 2020-10-02 RX ADMIN — ISODIUM CHLORIDE SCH MG: 0.03 SOLUTION RESPIRATORY (INHALATION) at 08:09

## 2020-10-02 RX ADMIN — LEUCINE, PHENYLALANINE, LYSINE, METHIONINE, ISOLEUCINE, VALINE, HISTIDINE, THREONINE, TRYPTOPHAN, ALANINE, GLYCINE, ARGININE, PROLINE, SERINE, TYROSINE, SODIUM ACETATE, DIBASIC POTASSIUM PHOSPHATE, MAGNESIUM CHLORIDE, SODIUM CHLORIDE, CALCIUM CHLORIDE, DEXTROSE SCH
365; 280; 290; 200; 300; 290; 240; 210; 90; 1035; 515; 575; 340; 250; 20; 340; 261; 51; 59; 33; 15 INJECTION INTRAVENOUS at 05:37

## 2020-10-02 RX ADMIN — LEUCINE, PHENYLALANINE, LYSINE, METHIONINE, ISOLEUCINE, VALINE, HISTIDINE, THREONINE, TRYPTOPHAN, ALANINE, GLYCINE, ARGININE, PROLINE, SERINE, TYROSINE, SODIUM ACETATE, DIBASIC POTASSIUM PHOSPHATE, MAGNESIUM CHLORIDE, SODIUM CHLORIDE, CALCIUM CHLORIDE, DEXTROSE SCH
365; 280; 290; 200; 300; 290; 240; 210; 90; 1035; 515; 575; 340; 250; 20; 340; 261; 51; 59; 33; 15 INJECTION INTRAVENOUS at 16:42

## 2020-10-02 RX ADMIN — CEFAZOLIN SCH: 330 INJECTION, POWDER, FOR SOLUTION INTRAMUSCULAR; INTRAVENOUS at 16:41

## 2020-10-02 RX ADMIN — PANTOPRAZOLE SODIUM SCH MG: 40 INJECTION, POWDER, FOR SOLUTION INTRAVENOUS at 08:42

## 2020-10-02 RX ADMIN — ISODIUM CHLORIDE SCH: 0.03 SOLUTION RESPIRATORY (INHALATION) at 11:38

## 2020-10-02 RX ADMIN — CEFAZOLIN SCH: 330 INJECTION, POWDER, FOR SOLUTION INTRAMUSCULAR; INTRAVENOUS at 05:37

## 2020-10-02 RX ADMIN — ENOXAPARIN SODIUM SCH MG: 40 INJECTION SUBCUTANEOUS at 08:42

## 2020-10-02 NOTE — P.DS
Providers


Date of admission: 


09/30/20 12:01





Expected date of discharge: 10/02/20


Attending physician: 


Gerry Garcia





Consults: 





                                        





09/30/20 14:51


Consult Physician Routine 


   Consulting Provider: Marito Brown


   Consult Reason/Comments: Medical management


   Do you want consulting provider notified?: Yes











Primary care physician: 


Marito Brown





Hospital Course: 





Discharge diagnosis


1.  Morbid obesity status post laparoscopic sleeve gastrectomy 








Hospital course





This is a 30-year-old female with known history of morbid obesity she is now 

status post laparoscopic sleeve gastrectomy.  She tolerated surgery well.  She 

is tolerating diet.  Upper GI notes postsurgical change of gastric sleeve with 

edema noted at the proximal LAD gastric sleeve component and mild obstruction.  

She is passing gas.  She's afebrile.  She's been up and ambulating.  Patient is 

stable for discharge.





Physician Assistant note has been reviewed by physician. Signing provider agrees

with the documented findings, assessment, and plan of care. 


Patient Condition at Discharge: Stable





Plan - Discharge Summary


Discharge Rx Participant: No


New Discharge Prescriptions: 


New


   bisacodyL [Dulcolax] 5 mg PO DAILY PRN #10 tablet.


     PRN Reason: Constipation


   Simethicone 40 mg/0.6 ml Drops [Mylicon Drops] 40 mg PO [facility] PRN #30 ml


     PRN Reason: Gas


   Hydrocodone/Acetaminophen [Norco 5-325] 1 tab PO Q6HR PRN 3 Days #12 tab


     PRN Reason: Pain


   Omeprazole [PriLOSEC] 40 mg PO DAILY #30 capsule.


   Ondansetron Odt [Zofran Odt] 4 mg PO Q8HR PRN #9 tab


     PRN Reason: Nausea





Continue


   Medroxyprogesterone Acetate [Depo-Provera] 150 mg IM Q90D


Discharge Medication List





Medroxyprogesterone Acetate [Depo-Provera] 150 mg IM Q90D 06/01/20 [History]


Hydrocodone/Acetaminophen [Norco 5-325] 1 tab PO Q6HR PRN 3 Days #12 tab 

10/02/20 [Rx]


Omeprazole [PriLOSEC] 40 mg PO DAILY #30 capsule. 10/02/20 [Rx]


Ondansetron Odt [Zofran Odt] 4 mg PO Q8HR PRN #9 tab 10/02/20 [Rx]


Simethicone 40 mg/0.6 ml Drops [Mylicon Drops] 40 mg PO [facility]S PRN #30 ml 10/02/20

[Rx]


bisacodyL [Dulcolax] 5 mg PO DAILY PRN #10 tablet. 10/02/20 [Rx]








Follow up Appointment(s)/Referral(s): 


Marito Brown DO [Primary Care Provider] - 1 Week


Gerry Garcia MD [STAFF PHYSICIAN] - 1 Week


Activity/Diet/Wound Care/Special Instructions: 


No driving while taking Norco


No lifting over 10 pounds


You may shower. No soaking or tub baths for 2 weeks


Very light activity until you are reevaluated at your follow up appointment with

your surgeon


You must open, crush or break all pills that are larger than the size of the tic

tac


No straws or carbonated beverages


Discharge Disposition: HOME SELF-CARE

## 2020-10-02 NOTE — P.PN
Subjective


Progress Note Date: 10/02/20


This is a 30-year-old female, morbidly obese, BMI 40.2, status post laparoscopic

sleeve gastrectomy.  Tolerated procedure well.  Ambulating with in room, 

Burping, no flatus, no bowel movement.  NPO,scheduled for upper GI a this AM.  

Denies chest pain, palpitations or shortness of breath.  Pain controlled.  

Afebrile.








10/2/20 tolerating bariatric clear liquid diet with no nausea or vomiting or 

diarrhea.  Passing flatus.  Ambulating, tolerating exertion well.  Afebrile, 

normal WBC.  Denies chest pain, palpitations or shortness of breath.





Objective





- Vital Signs


Vital signs: 


                                   Vital Signs











Temp  97.8 F   10/02/20 07:00


 


Pulse  76   10/02/20 08:18


 


Resp  16   10/02/20 00:36


 


BP  119/77   10/02/20 07:00


 


Pulse Ox  100   10/02/20 07:00








                                 Intake & Output











 10/01/20 10/02/20 10/02/20





 18:59 06:59 18:59


 


Intake Total 400  50


 


Balance 400  50


 


Weight 112.9 kg  


 


Intake:   


 


  Oral 400  50














- Exam


VITAL SIGNS: As above


GENERAL: Alert and oriented 3, Sitting up in bed, NAD.


HEENT:  Conjunctivae normal. eyes normal.  Oral mucosa moist.


NECK:  No JVD. No thyroid enlargement.


CARDIOVASCULAR:  S1, S2 regular. No murmur


RESPIRATION: Bilateral bases diminished .


ABDOMEN:  Soft, status post surgery.  No guarding.Bowel sounds heard.


LEGS: No edema. no swelling.  No calf tenderness


NERVOUS SYSTEM:  Cranial N 2-12 grossly normal. No focal deficits.  Strength and

sensation grossly intact.


Skin: Warm and dry, no rash 











- Labs


CBC & Chem 7: 


                                 10/02/20 06:11





                                 10/01/20 06:23


Labs: 


                  Abnormal Lab Results - Last 24 Hours (Table)











  10/01/20 10/02/20 Range/Units





  06:23 06:11 


 


Hgb   11.1 L  (11.4-16.0)  gm/dL


 


Carbon Dioxide  16.8 L   (21.6-31.8)  mmol/L


 


Anion Gap  13.20 H   (4.00-12.00)  mmol/L














Assessment and Plan


Assessment: 


Morbid obesity, BMI 40.2, status post laparoscopic sleeve gastrectomy


History of anxiety


History of depression

















Plan: Continue on current medication regime ,monitoring and symptomatic 

treatment.  Maintain aggressive pulmonary toileting with incentive spirometer 

reinforced.  Follow-up magnesium level pending.  Medically cleared for 

discharge.  Follow-up with PCP, Dr. Brown in 1 week.











The impression and plan of care has been dictated as directed.





:


I performed a history and examination of this patient,  discussed the same with 

the dictator.  I agree with the dictator's note ,documented as a scribe.  Any 

additional findings or plans will be noted.

## 2020-10-12 ENCOUNTER — HOSPITAL ENCOUNTER (OUTPATIENT)
Dept: HOSPITAL 47 - BARWHC3 | Age: 30
Discharge: HOME | End: 2020-10-12
Attending: SURGERY
Payer: COMMERCIAL

## 2020-10-12 VITALS — BODY MASS INDEX: 38.5 KG/M2

## 2020-10-12 VITALS
RESPIRATION RATE: 16 BRPM | DIASTOLIC BLOOD PRESSURE: 88 MMHG | SYSTOLIC BLOOD PRESSURE: 122 MMHG | TEMPERATURE: 98.2 F | HEART RATE: 82 BPM

## 2020-10-12 DIAGNOSIS — Z98.84: ICD-10-CM

## 2020-10-12 DIAGNOSIS — Z48.815: Primary | ICD-10-CM

## 2020-10-12 PROCEDURE — 97803 MED NUTRITION INDIV SUBSEQ: CPT

## 2020-10-12 PROCEDURE — 99211 OFF/OP EST MAY X REQ PHY/QHP: CPT

## 2020-10-12 NOTE — P.HPBAR
Bariatric H&P





- History & Physicial


H&P Date: 10/12/20


History & Physicial: 


Visit/CC: surg f/u 





Patient initial contact: 





Initial weight: 122.47 kg


Initial weight in pounds: 270.00


Height: 5 ft 6 in


Initial BMI: 43.5





Last weight: 





Current weight: 108.409 kg


Current weight in pounds: 239.00


Current BMI: 38.5





Ideal body weight (based on NIH guidelines): 58.967 kg





Excess body weight loss: 22.1%








The patient is a 30 year-old F who presents for Bariatric Assessment.  Patient 

presents today for gastric sleeve follow-up.  She is doing quite well.  She's 

had some minimal GERD.














Past Medical History


Past Medical History: No Reported History


Additional Past Medical History / Comment(s): FREQUENT HEADACHES, LOWER BACK 

PAIN,  STATES HAVING DIARRHEA , NAUSEA AND STOMACH PAIN., STATES CURRENT "COLD" 

WITH COUGH- PT TO NOTIFY DR LUKAS MIRZA.


History of Any Multi-Drug Resistant Organisms: None Reported


Past Surgical History:  Section


Additional Past Surgical History / Comment(s): EGD


Past Anesthesia/Blood Transfusion Reactions: No Reported Reaction


Additional Past Anesthesia/Blood Transfusion Reaction / Comm: no hx blood 

transfusion


Past Psychological History: Anxiety, Depression


Smoking Status: Never smoker


Past Alcohol Use History: Occasional


Past Drug Use History: None Reported





- Past Family History


  ** Mother


Family Medical History: No Reported History





  ** Father


Family Medical History: Hypertension





Surgical - Exam


                                   Vital Signs











Temp Pulse Resp BP


 


 98.2 F   82   16   122/88 


 


 10/12/20 14:48  10/12/20 14:48  10/12/20 14:48  10/12/20 14:48














- General


well developed, well nourished, no distress





- Eyes


PERRL





- ENT


normal pinna





- Neck


no masses





- Respiratory


normal expansion





- Cardiovascular


Rhythm: regular





- Abdomen


Abdomen: soft, non tender





Bariatric Assessment & Plan


Plan: 





Status post sleeve gastrectomy.  Patient is doing well.  She'll follow-up in 4 

weeks.





Bariatric Checklist


Checklist: 


Plan: 





Checklist: 





EGD: 


1. Hiatal hernia: 


2. H. Pylori: 





HgbA1c: 





Vitamin D: 





Smoking: Never smoker





Primary care physician referral: Dr. gerardo Brown ([facility])





Psychiatry clearance: 





Cardiology clearance: 





Sleep study: 





Diet journal: 





VTE risk score: 





VTE risk level: 





Rehab needs at discharge:

## 2020-10-26 ENCOUNTER — HOSPITAL ENCOUNTER (OUTPATIENT)
Dept: HOSPITAL 47 - BARWHC3 | Age: 30
Discharge: HOME | End: 2020-10-26
Attending: SURGERY
Payer: COMMERCIAL

## 2020-10-26 VITALS — TEMPERATURE: 98.1 F | DIASTOLIC BLOOD PRESSURE: 85 MMHG | SYSTOLIC BLOOD PRESSURE: 111 MMHG | HEART RATE: 85 BPM

## 2020-10-26 VITALS — BODY MASS INDEX: 37.4 KG/M2

## 2020-10-26 DIAGNOSIS — E66.01: Primary | ICD-10-CM

## 2020-10-26 DIAGNOSIS — Z71.3: ICD-10-CM

## 2020-10-26 PROCEDURE — 99211 OFF/OP EST MAY X REQ PHY/QHP: CPT

## 2020-10-26 PROCEDURE — 97803 MED NUTRITION INDIV SUBSEQ: CPT

## 2020-10-26 NOTE — P.HPBAR
Bariatric H&P





- History & Physicial


H&P Date: 10/26/20


History & Physicial: 


Visit/CC: follow up post surgery





Patient initial contact: 





Initial weight: 122.47 kg


Initial weight in pounds: 270.00


Height: 


Initial BMI: 





Last weight: 





Current weight: 


Current weight in pounds: 


Current BMI: 





Ideal body weight (based on NIH guidelines): 





Excess body weight loss: 








The patient is a 30 year-old F who presents for Bariatric Assessment.  Patient 

presents today for sleeve gastrectomy follow-up.  She's had some minimal GERD.  

She's had excellent weight loss.














Past Medical History


Past Medical History: No Reported History


Additional Past Medical History / Comment(s): FREQUENT HEADACHES, LOWER BACK 

PAIN,  STATES HAVING DIARRHEA , NAUSEA AND STOMACH PAIN., STATES CURRENT "COLD" 

WITH COUGH- PT TO NOTIFY DR LUKAS MIRZA.


History of Any Multi-Drug Resistant Organisms: None Reported


Past Surgical History: Bariatric Surgery,  Section


Additional Past Surgical History / Comment(s): EGD; sleeve 2020


Past Anesthesia/Blood Transfusion Reactions: No Reported Reaction


Additional Past Anesthesia/Blood Transfusion Reaction / Comm: no hx blood 

transfusion


Past Psychological History: Anxiety, Depression


Smoking Status: Never smoker


Past Alcohol Use History: Occasional


Past Drug Use History: None Reported





- Past Family History


  ** Mother


Family Medical History: No Reported History





  ** Father


Family Medical History: Hypertension





Surgical - Exam


                                   Vital Signs











Temp Pulse BP


 


 98.1 F   85   111/85 


 


 10/26/20 14:41  10/26/20 14:41  10/26/20 14:41














- General


well developed, well nourished, no distress





- Eyes


PERRL





- ENT


normal pinna





- Neck


no masses





- Respiratory


normal expansion





- Cardiovascular


Rhythm: regular





- Abdomen


Abdomen: soft, non tender





Bariatric Assessment & Plan


Plan: 





Morbid obesity.  Patient is minimal will be observed.  She'll follow-up in 4 

weeks.





Bariatric Checklist


Checklist: 


Plan: 





Checklist: 





EGD: 


1. Hiatal hernia: 


2. H. Pylori: 





HgbA1c: 





Vitamin D: 





Smoking: Never smoker





Primary care physician referral: Dr. gerardo Brown ([facility])





Psychiatry clearance: 





Cardiology clearance: 





Sleep study: 





Diet journal: 





VTE risk score: 





VTE risk level: 





Rehab needs at discharge:

## 2020-11-23 ENCOUNTER — HOSPITAL ENCOUNTER (OUTPATIENT)
Dept: HOSPITAL 47 - BARWHC3 | Age: 30
Discharge: HOME | End: 2020-11-23
Attending: SURGERY
Payer: COMMERCIAL

## 2020-11-23 VITALS
TEMPERATURE: 98.3 F | HEART RATE: 96 BPM | SYSTOLIC BLOOD PRESSURE: 115 MMHG | DIASTOLIC BLOOD PRESSURE: 80 MMHG | RESPIRATION RATE: 18 BRPM

## 2020-11-23 VITALS — BODY MASS INDEX: 35.5 KG/M2

## 2020-11-23 DIAGNOSIS — E66.01: ICD-10-CM

## 2020-11-23 DIAGNOSIS — Z48.815: Primary | ICD-10-CM

## 2020-11-23 DIAGNOSIS — Z98.84: ICD-10-CM

## 2020-11-23 DIAGNOSIS — K21.9: ICD-10-CM

## 2020-11-23 PROCEDURE — 99211 OFF/OP EST MAY X REQ PHY/QHP: CPT

## 2020-12-03 NOTE — P.HPBAR
Bariatric H&P





- History & Physicial


H&P Date: 20


History & Physicial: 


Visit/CC: follow up 2 months





Patient initial contact: 





Initial weight: 122.47 kg


Initial weight in pounds: 270.00


Height: 5 ft 6 in


Initial BMI: 43.5





Last weight: 





Current weight: 99.79 kg


Current weight in pounds: 220.00


Current BMI: 35.5





Ideal body weight (based on NIH guidelines): 58.967 kg





Excess body weight loss: 35.7%








The patient is a 30 year-old F who presents for Bariatric Assessment.  Patient 

presents today for sleeve gastrectomy follow-up.  She is doing well.  She has 

some minimal GERD.














Past Medical History


Past Medical History: No Reported History


Additional Past Medical History / Comment(s): FREQUENT HEADACHES, LOWER BACK 

PAIN,  STATES HAVING DIARRHEA , NAUSEA AND STOMACH PAIN., STATES CURRENT "COLD" 

WITH COUGH- PT TO NOTIFY DR LUKAS MIRZA.


History of Any Multi-Drug Resistant Organisms: None Reported


Past Surgical History: Bariatric Surgery,  Section


Additional Past Surgical History / Comment(s): EGD; sleeve 2020


Past Anesthesia/Blood Transfusion Reactions: No Reported Reaction


Additional Past Anesthesia/Blood Transfusion Reaction / Comm: no hx blood 

transfusion


Past Psychological History: Anxiety, Depression


Smoking Status: Never smoker


Past Alcohol Use History: Occasional


Past Drug Use History: None Reported





- Past Family History


  ** Mother


Family Medical History: No Reported History





  ** Father


Family Medical History: Hypertension





Surgical - Exam


                                   Vital Signs











Temp Pulse Resp BP


 


 98.3 F   96   18   115/80 


 


 20 14:20  20 14:20  20 14:20  20 14:20














- General


well developed, well nourished, no distress





- Eyes


PERRL





- ENT


normal pinna





- Neck


no masses





- Respiratory


normal expansion





- Cardiovascular


Rhythm: regular





- Abdomen


Abdomen: soft, non tender





Bariatric Assessment & Plan


Plan: 





Morbid obesity.  Patient did quite well from her gastric sleeve.  Her GERD is 

minimal will be observed.





Bariatric Checklist


Checklist: 


Plan: 





Checklist: 





EGD: 


1. Hiatal hernia: 


2. H. Pylori: 





HgbA1c: 





Vitamin D: 





Smoking: Never smoker





Primary care physician referral: Dr. gerardo Brown ([facility])





Psychiatry clearance: 





Cardiology clearance: 





Sleep study: 





Diet journal: 





VTE risk score: 





VTE risk level: 





Rehab needs at discharge:

## 2020-12-21 ENCOUNTER — HOSPITAL ENCOUNTER (OUTPATIENT)
Dept: HOSPITAL 47 - BARWHC3 | Age: 30
Discharge: HOME | End: 2020-12-21
Attending: SURGERY
Payer: COMMERCIAL

## 2020-12-21 VITALS
TEMPERATURE: 98.4 F | HEART RATE: 73 BPM | RESPIRATION RATE: 18 BRPM | DIASTOLIC BLOOD PRESSURE: 78 MMHG | SYSTOLIC BLOOD PRESSURE: 115 MMHG

## 2020-12-21 VITALS — BODY MASS INDEX: 33.5 KG/M2

## 2020-12-21 DIAGNOSIS — Z98.84: ICD-10-CM

## 2020-12-21 DIAGNOSIS — E66.01: ICD-10-CM

## 2020-12-21 DIAGNOSIS — Z71.3: ICD-10-CM

## 2020-12-21 DIAGNOSIS — Z48.815: Primary | ICD-10-CM

## 2020-12-21 PROCEDURE — 99211 OFF/OP EST MAY X REQ PHY/QHP: CPT

## 2020-12-21 PROCEDURE — 97803 MED NUTRITION INDIV SUBSEQ: CPT

## 2020-12-21 NOTE — P.HPBAR
Bariatric H&P





- History & Physicial


H&P Date: 20


History & Physicial: 


Visit/CC: follow up 3 months





Patient initial contact: 





Initial weight: 122.47 kg


Initial weight in pounds: 270.00


Height: 5 ft 6 in


Initial BMI: 43.5





Last weight: 





Current weight: 94.347 kg


Current weight in pounds: 208.00


Current BMI: 33.5





Ideal body weight (based on NIH guidelines): 58.967 kg





Excess body weight loss: 44.2%








The patient is a 30 year-old F who presents for Bariatric Assessment.  Patient 

presents today for sleeve gastrectomy follow-up.  She feels quite well.  She's 

had some minimal GERD.














Past Medical History


Past Medical History: No Reported History


Additional Past Medical History / Comment(s): FREQUENT HEADACHES, LOWER BACK 

PAIN,  STATES HAVING DIARRHEA , NAUSEA AND STOMACH PAIN., STATES CURRENT "COLD" 

WITH COUGH- PT TO NOTIFY DR LUKAS MIRZA.


History of Any Multi-Drug Resistant Organisms: None Reported


Past Surgical History: Bariatric Surgery,  Section


Additional Past Surgical History / Comment(s): EGD; sleeve 2020


Past Anesthesia/Blood Transfusion Reactions: No Reported Reaction


Additional Past Anesthesia/Blood Transfusion Reaction / Comm: no hx blood 

transfusion


Past Psychological History: Anxiety, Depression


Smoking Status: Never smoker


Past Alcohol Use History: Occasional


Past Drug Use History: None Reported





- Past Family History


  ** Mother


Family Medical History: No Reported History





  ** Father


Family Medical History: Hypertension





Surgical - Exam


                                   Vital Signs











Temp Pulse Resp BP


 


 98.4 F   73   18   115/78 


 


 20 13:10  20 13:10  20 13:10  20 13:10














- General


well developed, well nourished, no distress





- Eyes


PERRL





- ENT


normal pinna





- Neck


no masses





- Respiratory


normal expansion





- Cardiovascular


Rhythm: regular





- Abdomen


Abdomen: soft, non tender





Bariatric Assessment & Plan


Plan: 





Status post sleeve inserted.  Patient is minimal was observed.  She'll follow-up

in 4 weeks.





Bariatric Checklist


Checklist: 


Plan: 





Checklist: 





EGD: 


1. Hiatal hernia: 


2. H. Pylori: 





HgbA1c: 





Vitamin D: 





Smoking: Never smoker





Primary care physician referral: Dr. gerardo Brown ([facility])





Psychiatry clearance: 





Cardiology clearance: 





Sleep study: 





Diet journal: 





VTE risk score: 





VTE risk level: 





Rehab needs at discharge:

## 2021-01-13 ENCOUNTER — HOSPITAL ENCOUNTER (OUTPATIENT)
Dept: HOSPITAL 47 - LABWHC1 | Age: 31
Discharge: HOME | End: 2021-01-13
Attending: SURGERY
Payer: COMMERCIAL

## 2021-01-13 DIAGNOSIS — E66.01: ICD-10-CM

## 2021-01-13 DIAGNOSIS — E55.9: Primary | ICD-10-CM

## 2021-01-13 DIAGNOSIS — E44.0: ICD-10-CM

## 2021-01-13 LAB
ALBUMIN SERPL-MCNC: 4.5 G/DL (ref 3.8–4.9)
ALBUMIN/GLOB SERPL: 1.67 G/DL (ref 1.6–3.17)
ALP SERPL-CCNC: 62 U/L (ref 41–126)
ALT SERPL-CCNC: 11 U/L (ref 8–44)
ANION GAP SERPL CALC-SCNC: 6.6 MMOL/L (ref 4–12)
AST SERPL-CCNC: 14 U/L (ref 13–35)
BUN SERPL-SCNC: 23 MG/DL (ref 9–27)
BUN/CREAT SERPL: 32.86 RATIO (ref 12–20)
CALCIUM SPEC-MCNC: 9.7 MG/DL (ref 8.7–10.3)
CHLORIDE SERPL-SCNC: 105 MMOL/L (ref 96–109)
CO2 SERPL-SCNC: 27.4 MMOL/L (ref 21.6–31.8)
ERYTHROCYTE [DISTWIDTH] IN BLOOD BY AUTOMATED COUNT: 3.99 M/UL (ref 3.8–5.4)
ERYTHROCYTE [DISTWIDTH] IN BLOOD: 14.2 % (ref 11.5–15.5)
GLOBULIN SER CALC-MCNC: 2.7 G/DL (ref 1.6–3.3)
GLUCOSE SERPL-MCNC: 90 MG/DL (ref 70–110)
HCT VFR BLD AUTO: 36.7 % (ref 34–46)
HGB BLD-MCNC: 12.4 GM/DL (ref 11.4–16)
MCH RBC QN AUTO: 30.9 PG (ref 25–35)
MCHC RBC AUTO-ENTMCNC: 33.7 G/DL (ref 31–37)
MCV RBC AUTO: 91.9 FL (ref 80–100)
PLATELET # BLD AUTO: 248 K/UL (ref 150–450)
POTASSIUM SERPL-SCNC: 4.2 MMOL/L (ref 3.5–5.5)
PROT SERPL-MCNC: 7.2 G/DL (ref 6.2–8.2)
SODIUM SERPL-SCNC: 139 MMOL/L (ref 135–145)
VIT B12 SERPL-MCNC: 319 PG/ML (ref 200–944)
WBC # BLD AUTO: 7 K/UL (ref 3.8–10.6)

## 2021-01-13 PROCEDURE — 84443 ASSAY THYROID STIM HORMONE: CPT

## 2021-01-13 PROCEDURE — 80053 COMPREHEN METABOLIC PANEL: CPT

## 2021-01-13 PROCEDURE — 85027 COMPLETE CBC AUTOMATED: CPT

## 2021-01-13 PROCEDURE — 82306 VITAMIN D 25 HYDROXY: CPT

## 2021-01-13 PROCEDURE — 82607 VITAMIN B-12: CPT

## 2021-01-13 PROCEDURE — 82746 ASSAY OF FOLIC ACID SERUM: CPT

## 2021-01-13 PROCEDURE — 36415 COLL VENOUS BLD VENIPUNCTURE: CPT

## 2021-01-13 PROCEDURE — 84425 ASSAY OF VITAMIN B-1: CPT

## 2021-01-18 ENCOUNTER — HOSPITAL ENCOUNTER (OUTPATIENT)
Dept: HOSPITAL 47 - BARWHC3 | Age: 31
Discharge: HOME | End: 2021-01-18
Attending: SURGERY
Payer: COMMERCIAL

## 2021-01-18 VITALS
DIASTOLIC BLOOD PRESSURE: 81 MMHG | RESPIRATION RATE: 18 BRPM | HEART RATE: 89 BPM | TEMPERATURE: 98.1 F | SYSTOLIC BLOOD PRESSURE: 183 MMHG

## 2021-01-18 DIAGNOSIS — Z98.890: ICD-10-CM

## 2021-01-18 DIAGNOSIS — Z48.815: Primary | ICD-10-CM

## 2021-01-18 DIAGNOSIS — Z98.84: ICD-10-CM

## 2021-01-18 PROCEDURE — 99211 OFF/OP EST MAY X REQ PHY/QHP: CPT

## 2021-01-18 NOTE — P.HPBAR
Bariatric H&P





- History & Physicial


H&P Date: 21


History & Physicial: 


Visit/CC: follow up / 4 months





Patient initial contact: 





Initial weight: 122.47 kg


Initial weight in pounds: 270.00


Height: 5 ft 6 in


Initial BMI: 





Last weight: 





Current weight: 92.079 kg


Current weight in pounds: 


Current BMI: 





Ideal body weight (based on NIH guidelines): 





Excess body weight loss: 








The patient is a 30 year-old F who presents for Bariatric Assessment.  Patient 

presents today for sleeve gastrectomy follow-up.  She's had some mild GERD.














Past Medical History


Past Medical History: No Reported History


Additional Past Medical History / Comment(s): FREQUENT HEADACHES, LOWER BACK 

PAIN,  STATES HAVING DIARRHEA , NAUSEA AND STOMACH PAIN., STATES CURRENT "COLD" 

WITH COUGH- PT TO NOTIFY DR LUKAS MIRZA.


History of Any Multi-Drug Resistant Organisms: None Reported


Past Surgical History: Bariatric Surgery,  Section


Additional Past Surgical History / Comment(s): EGD; sleeve 2020


Past Anesthesia/Blood Transfusion Reactions: No Reported Reaction


Additional Past Anesthesia/Blood Transfusion Reaction / Comm: no hx blood 

transfusion


Past Psychological History: Anxiety, Depression


Smoking Status: Never smoker


Past Alcohol Use History: Occasional


Past Drug Use History: None Reported





- Past Family History


  ** Mother


Family Medical History: No Reported History





  ** Father


Family Medical History: Hypertension





Surgical - Exam


                                   Vital Signs











Temp Pulse Resp BP


 


 98.1 F   89   18   183/81 


 


 21 13:11  21 13:11  21 13:11  21 13:11














- General


well developed, well nourished, no distress





- Eyes


PERRL





- ENT


normal pinna





- Neck


no masses





- Respiratory


normal expansion





- Cardiovascular


Rhythm: regular





- Abdomen


Abdomen: soft, non tender





Bariatric Assessment & Plan


Plan: 





Status post sleeve gastric.  Patient's GERD is minimal be observed.  She'll 

follow-up in 4 weeks.





Bariatric Checklist


Checklist: 


Plan: 





Checklist: 





EGD: 


1. Hiatal hernia: 


2. H. Pylori: 





HgbA1c: 





Vitamin D: 





Smoking: Never smoker





Primary care physician referral: Dr. gerardo Brown ([facility])





Psychiatry clearance: 





Cardiology clearance: 





Sleep study: 





Diet journal: 





VTE risk score: 





VTE risk level: 





Rehab needs at discharge:

## 2021-02-22 ENCOUNTER — HOSPITAL ENCOUNTER (OUTPATIENT)
Dept: HOSPITAL 47 - BARWHC3 | Age: 31
Discharge: HOME | End: 2021-02-22
Attending: SURGERY
Payer: COMMERCIAL

## 2021-02-22 DIAGNOSIS — Z53.9: Primary | ICD-10-CM

## 2021-03-22 ENCOUNTER — HOSPITAL ENCOUNTER (OUTPATIENT)
Dept: HOSPITAL 47 - BARWHC3 | Age: 31
End: 2021-03-22
Attending: SURGERY
Payer: COMMERCIAL

## 2021-03-22 VITALS
DIASTOLIC BLOOD PRESSURE: 83 MMHG | SYSTOLIC BLOOD PRESSURE: 118 MMHG | HEART RATE: 74 BPM | RESPIRATION RATE: 16 BRPM | TEMPERATURE: 98.9 F

## 2021-03-22 VITALS — BODY MASS INDEX: 31.1 KG/M2

## 2021-03-22 DIAGNOSIS — K21.9: ICD-10-CM

## 2021-03-22 DIAGNOSIS — F32.9: ICD-10-CM

## 2021-03-22 DIAGNOSIS — Z98.84: Primary | ICD-10-CM

## 2021-03-22 DIAGNOSIS — F41.9: ICD-10-CM

## 2021-03-22 PROCEDURE — 99211 OFF/OP EST MAY X REQ PHY/QHP: CPT

## 2021-03-22 NOTE — P.HPBAR
Bariatric H&P





- History & Physicial


H&P Date: 21


History & Physicial: 


Visit/CC: F/U





Patient initial contact: 





Initial weight: 122.47 kg


Initial weight in pounds: 270.00


Height: 5 ft 6 in


Initial BMI: 43.5





Last weight: 





Current weight: 87.543 kg


Current weight in pounds: 193.00


Current BMI: 31.1





Ideal body weight (based on NIH guidelines): 58.967 kg





Excess body weight loss: 55.0%








The patient is a 30 year-old F who presents for Bariatric Assessment.  She 

presents today for sleeve gastrectomy fall.  She's had some mild GERD.  She is 

an excellent weight loss.














Past Medical History


Past Medical History: No Reported History


Additional Past Medical History / Comment(s): FREQUENT HEADACHES, LOWER BACK 

PAIN,  STATES HAVING DIARRHEA , NAUSEA AND STOMACH PAIN., STATES CURRENT "COLD" 

WITH COUGH- PT TO NOTIFY DR LUKAS MIRZA.


History of Any Multi-Drug Resistant Organisms: None Reported


Past Surgical History: Bariatric Surgery,  Section


Additional Past Surgical History / Comment(s): EGD; sleeve 2020


Past Anesthesia/Blood Transfusion Reactions: No Reported Reaction


Additional Past Anesthesia/Blood Transfusion Reaction / Comm: no hx blood 

transfusion


Past Psychological History: Anxiety, Depression


Smoking Status: Never smoker


Past Alcohol Use History: Occasional


Past Drug Use History: None Reported





- Past Family History


  ** Mother


Family Medical History: No Reported History





  ** Father


Family Medical History: Hypertension





Surgical - Exam


                                   Vital Signs











Temp Pulse Resp BP


 


 98.9 F   74   16   118/83 


 


 21 14:30  21 14:30  21 14:30  21 14:30














- General


well developed, well nourished, no distress





- Eyes


PERRL





- ENT


normal pinna





- Abdomen


Abdomen: soft, non tender





Bariatric Assessment & Plan


Plan: 





Status post sleeve gastrectomy.  Patient's GERD is minimal abnormalities or.  

She'll follow-up in 4 weeks.





Bariatric Checklist


Checklist: 


Plan: 





Checklist: 





EGD: 


1. Hiatal hernia: 


2. H. Pylori: 





HgbA1c: 





Vitamin D: 





Smoking: Never smoker





Primary care physician referral: Dr. gerardo Brown ([facility])





Psychiatry clearance: 





Cardiology clearance: 





Sleep study: 





Diet journal: 





VTE risk score: 





VTE risk level: 





Rehab needs at discharge:

## 2021-04-19 ENCOUNTER — HOSPITAL ENCOUNTER (OUTPATIENT)
Dept: HOSPITAL 47 - BARWHC3 | Age: 31
End: 2021-04-19
Attending: SURGERY
Payer: COMMERCIAL

## 2021-04-19 VITALS
SYSTOLIC BLOOD PRESSURE: 136 MMHG | HEART RATE: 81 BPM | DIASTOLIC BLOOD PRESSURE: 79 MMHG | RESPIRATION RATE: 18 BRPM | TEMPERATURE: 98.3 F

## 2021-04-19 VITALS — BODY MASS INDEX: 30.7 KG/M2

## 2021-04-19 DIAGNOSIS — F32.9: ICD-10-CM

## 2021-04-19 DIAGNOSIS — Z98.84: ICD-10-CM

## 2021-04-19 DIAGNOSIS — E66.01: Primary | ICD-10-CM

## 2021-04-19 PROCEDURE — 99211 OFF/OP EST MAY X REQ PHY/QHP: CPT

## 2021-04-19 NOTE — P.HPBAR
Bariatric H&P





- History & Physicial


H&P Date: 21


History & Physicial: 


Visit/CC: follow up





Patient initial contact: 





Initial weight: 122.47 kg


Initial weight in pounds: 270.00


Height: 5 ft 6 in


Initial BMI: 43.5





Last weight: 





Current weight: 86.183 kg


Current weight in pounds: 190.00


Current BMI: 30.7





Ideal body weight (based on NIH guidelines): 58.967 kg





Excess body weight loss: 57.1%








The patient is a 30 year-old F who presents for Bariatric Assessment.  Patient 

presents today for sleeve gastrectomy follow-up.  She is lesser 7 pounds.  She 

feels good.  She has a mild GERD.














Past Medical History


Past Medical History: No Reported History


Additional Past Medical History / Comment(s): FREQUENT HEADACHES, LOWER BACK 

PAIN,  STATES HAVING DIARRHEA , NAUSEA AND STOMACH PAIN., STATES CURRENT "COLD" 

WITH COUGH- PT TO NOTIFY DR LUKAS MIRZA.


History of Any Multi-Drug Resistant Organisms: None Reported


Past Surgical History: Bariatric Surgery,  Section


Additional Past Surgical History / Comment(s): EGD; sleeve 2020


Past Anesthesia/Blood Transfusion Reactions: No Reported Reaction


Additional Past Anesthesia/Blood Transfusion Reaction / Comm: no hx blood 

transfusion


Past Psychological History: Anxiety, Depression


Smoking Status: Never smoker


Past Alcohol Use History: Occasional


Past Drug Use History: None Reported





- Past Family History


  ** Mother


Family Medical History: No Reported History





  ** Father


Family Medical History: Hypertension





Surgical - Exam


                                   Vital Signs











Temp Pulse Resp BP


 


 98.3 F   81   18   136/79 


 


 21 14:05  21 14:05  21 14:05  21 14:05














- General


well developed, well nourished, no distress





- Eyes


PERRL





- ENT


normal pinna





- Neck


no masses





- Respiratory


normal expansion





- Cardiovascular


Rhythm: regular





- Abdomen


Abdomen: soft, non tender





Bariatric Assessment & Plan


Plan: 





Status post sleeve yesterday.  Patient is minimal old observed.  She'll follow-

up in 4 weeks.





Bariatric Checklist


Checklist: 


Plan: 





Checklist: 





EGD: 


1. Hiatal hernia: 


2. H. Pylori: 





HgbA1c: 





Vitamin D: 





Smoking: Never smoker





Primary care physician referral: Dr. gerardo Brown ([facility])





Psychiatry clearance: 





Cardiology clearance: 





Sleep study: 





Diet journal: 





VTE risk score: 





VTE risk level: 





Rehab needs at discharge:

## 2021-06-07 ENCOUNTER — HOSPITAL ENCOUNTER (OUTPATIENT)
Dept: HOSPITAL 47 - BARWHC3 | Age: 31
End: 2021-06-07
Attending: SURGERY
Payer: COMMERCIAL

## 2021-06-07 VITALS — BODY MASS INDEX: 30.8 KG/M2

## 2021-06-07 DIAGNOSIS — F41.9: ICD-10-CM

## 2021-06-07 DIAGNOSIS — K21.9: ICD-10-CM

## 2021-06-07 DIAGNOSIS — Z98.84: ICD-10-CM

## 2021-06-07 DIAGNOSIS — Z88.0: ICD-10-CM

## 2021-06-07 DIAGNOSIS — Z88.1: ICD-10-CM

## 2021-06-07 DIAGNOSIS — Z09: Primary | ICD-10-CM

## 2021-06-07 DIAGNOSIS — F32.9: ICD-10-CM

## 2021-06-07 PROCEDURE — 97803 MED NUTRITION INDIV SUBSEQ: CPT

## 2021-06-07 PROCEDURE — 99211 OFF/OP EST MAY X REQ PHY/QHP: CPT

## 2021-06-08 VITALS — TEMPERATURE: 98 F | DIASTOLIC BLOOD PRESSURE: 84 MMHG | SYSTOLIC BLOOD PRESSURE: 113 MMHG | HEART RATE: 76 BPM

## 2021-06-15 NOTE — P.HPBAR
Bariatric H&P





- History & Physicial


H&P Date: 21


History & Physicial: 


Visit/CC: nine month follow up





Patient initial contact: 





Initial weight: 122.47 kg


Initial weight in pounds: 270.00


Height: 5 ft 6 in


Initial BMI: 43.5





Last weight: 





Current weight: 86.636 kg


Current weight in pounds: 191.00


Current BMI: 30.8





Ideal body weight (based on NIH guidelines): 58.967 kg





Excess body weight loss: 56.4%








The patient is a 30 year-old F who presents for Bariatric Assessment.  She 

presents today for sleeve gastrectomy follow-up.  She's had some complaints of 

GERD.














Past Medical History


Past Medical History: No Reported History


Additional Past Medical History / Comment(s): FREQUENT HEADACHES, LOWER BACK 

PAIN,  STATES HAVING DIARRHEA , NAUSEA AND STOMACH PAIN., STATES CURRENT "COLD" 

WITH COUGH- PT TO NOTIFY DR LUKAS MIRZA.


History of Any Multi-Drug Resistant Organisms: None Reported


Past Surgical History: Bariatric Surgery,  Section


Additional Past Surgical History / Comment(s): EGD; sleeve 2020


Past Anesthesia/Blood Transfusion Reactions: No Reported Reaction


Additional Past Anesthesia/Blood Transfusion Reaction / Comm: no hx blood 

transfusion


Past Psychological History: Anxiety, Depression


Smoking Status: Never smoker


Past Alcohol Use History: Occasional


Past Drug Use History: None Reported





- Past Family History


  ** Mother


Family Medical History: No Reported History





  ** Father


Family Medical History: Hypertension





Surgical - Exam


                                   Vital Signs











Temp Pulse BP


 


 98 F   76   113/84 


 


 21 15:01  21 15:01  21 15:01














- General


well developed, well nourished, no distress





- Eyes


PERRL





- ENT


normal pinna





- Neck


no masses





- Respiratory


normal expansion





- Cardiovascular


Rhythm: regular





- Abdomen


Abdomen: soft, non tender





Bariatric Assessment & Plan


Plan: 





Status post sleeve gastrectomy.  Patient's adnexal weight loss.  She will 

follow-up in 3 months.  Her GERD is minimal.





Bariatric Checklist


Checklist: 


Plan: 





Checklist: 





EGD: 


1. Hiatal hernia: 


2. H. Pylori: 





HgbA1c: 





Vitamin D: 





Smoking: Never smoker





Primary care physician referral: Dr. gerardo Brown ([facility])





Psychiatry clearance: 





Cardiology clearance: 





Sleep study: 





Diet journal: 





VTE risk score: 





VTE risk level: 





Rehab needs at discharge:

## 2021-08-09 ENCOUNTER — HOSPITAL ENCOUNTER (OUTPATIENT)
Dept: HOSPITAL 47 - BARWHC3 | Age: 31
End: 2021-08-09
Attending: SURGERY
Payer: COMMERCIAL

## 2021-08-09 VITALS
SYSTOLIC BLOOD PRESSURE: 111 MMHG | HEART RATE: 82 BPM | DIASTOLIC BLOOD PRESSURE: 75 MMHG | RESPIRATION RATE: 18 BRPM | TEMPERATURE: 98 F

## 2021-08-09 VITALS — BODY MASS INDEX: 30.3 KG/M2

## 2021-08-09 DIAGNOSIS — Z71.3: ICD-10-CM

## 2021-08-09 DIAGNOSIS — E66.01: Primary | ICD-10-CM

## 2021-08-09 DIAGNOSIS — Z88.1: ICD-10-CM

## 2021-08-09 DIAGNOSIS — Z88.0: ICD-10-CM

## 2021-08-09 PROCEDURE — 99211 OFF/OP EST MAY X REQ PHY/QHP: CPT

## 2021-08-09 PROCEDURE — 97803 MED NUTRITION INDIV SUBSEQ: CPT

## 2022-06-13 ENCOUNTER — HOSPITAL ENCOUNTER (OUTPATIENT)
Dept: HOSPITAL 47 - BARWHC3 | Age: 32
End: 2022-06-13
Attending: SURGERY
Payer: COMMERCIAL

## 2022-06-13 VITALS — BODY MASS INDEX: 24.7 KG/M2

## 2022-06-13 DIAGNOSIS — F41.9: ICD-10-CM

## 2022-06-13 DIAGNOSIS — Z09: Primary | ICD-10-CM

## 2022-06-13 DIAGNOSIS — F32.A: ICD-10-CM

## 2022-06-13 DIAGNOSIS — Z98.84: ICD-10-CM

## 2022-06-13 PROCEDURE — 99211 OFF/OP EST MAY X REQ PHY/QHP: CPT

## 2022-06-13 NOTE — P.HPBAR
Bariatric H&P





- History & Physicial


H&P Date: 22


History & Physicial: 


Visit/CC: 





Patient initial contact: 





Initial weight: 122.47 kg


Initial weight in pounds: 


Height: 


Initial BMI: 





Last weight: 





Current weight: 


Current weight in pounds: 


Current BMI: 





Ideal body weight (based on NIH guidelines): 





Excess body weight loss: 








The patient is a 31 year-old F who presents for Bariatric Assessment.  Patient 

presents today for bariatric follow-up.  Her current weight today is 153 pounds.

 Sure last weight was 188 pounds.  She is an excellent weight loss.  She has a 

well-formed panniculus.  She's had troubles with skin rashes at her panniculus. 

She wishes undergo panniculectomy.








Past Medical History


Past Medical History: No Reported History


Additional Past Medical History / Comment(s): FREQUENT HEADACHES, LOWER BACK 

PAIN,  STATES HAVING DIARRHEA , NAUSEA AND STOMACH PAIN., STATES CURRENT "COLD" 

WITH COUGH- PT TO NOTIFY DR LUKAS MIRZA.


History of Any Multi-Drug Resistant Organisms: None Reported


Past Surgical History: Bariatric Surgery,  Section


Additional Past Surgical History / Comment(s): EGD; sleeve 2020


Past Anesthesia/Blood Transfusion Reactions: No Reported Reaction


Additional Past Anesthesia/Blood Transfusion Reaction / Comm: no hx blood 

transfusion


Past Psychological History: Anxiety, Depression


Smoking Status: Never smoker


Past Alcohol Use History: Occasional


Past Drug Use History: None Reported





- Past Family History


  ** Mother


Family Medical History: No Reported History





  ** Father


Family Medical History: Hypertension





Surgical - Exam





- General


well developed, well nourished, no distress





- Eyes


PERRL





- ENT


normal pinna





- Neck


no masses





- Respiratory


normal expansion





- Cardiovascular


Rhythm: regular





- Abdomen





Well-formed panniculus evidence of chronic skin irritation


Abdomen: soft, non tender





Bariatric Assessment & Plan


Plan: 





Cholecystitis.  Patient be scheduled for panniculectomy once insurance 

authorization is completed.





Bariatric Checklist


Checklist: 


Plan: 





Checklist: 





EGD: 


1. Hiatal hernia: 


2. H. Pylori: 





HgbA1c: 





Vitamin D: 





Smoking: Never smoker





Primary care physician referral: Dr. gerardo Brown ([facility])





Psychiatry clearance: 





Cardiology clearance: 





Sleep study: 





Diet journal: 





VTE risk score: 





VTE risk level: 





Rehab needs at discharge:

## 2022-06-14 VITALS
DIASTOLIC BLOOD PRESSURE: 78 MMHG | SYSTOLIC BLOOD PRESSURE: 110 MMHG | RESPIRATION RATE: 16 BRPM | TEMPERATURE: 98.6 F | HEART RATE: 86 BPM

## 2023-05-02 ENCOUNTER — HOSPITAL ENCOUNTER (OUTPATIENT)
Dept: HOSPITAL 47 - RADCTMAIN | Age: 33
Discharge: HOME | End: 2023-05-02
Attending: FAMILY MEDICINE
Payer: COMMERCIAL

## 2023-05-02 DIAGNOSIS — Z98.84: ICD-10-CM

## 2023-05-02 DIAGNOSIS — R19.5: ICD-10-CM

## 2023-05-02 DIAGNOSIS — R10.11: Primary | ICD-10-CM

## 2023-05-02 PROCEDURE — 74160 CT ABDOMEN W/CONTRAST: CPT

## 2023-05-02 PROCEDURE — 36415 COLL VENOUS BLD VENIPUNCTURE: CPT

## 2023-05-02 NOTE — CT
EXAMINATION TYPE: CT abdomen w con

 

DATE OF EXAM: 5/2/2023

 

COMPARISON: 9/4/2017

 

HISTORY: 32-year-old female R10.11 epigastric pain, hx of gastric sleeve

 

TECHNIQUE: Contiguous axial scanning of the abdomen following administration of 100 ml Isovue 300 IV 
contrast.  Delayed images through the kidneys and coronal/sagittal reconstructions performed.

 

CT DLP: 425.4 mGycm

Automated exposure control for dose reduction was used.

 

 

FINDINGS: 

Heart normal size without pericardial effusion. Lung bases clear without pleural effusion.

 

There may be a tiny hiatal hernia present. Postsurgical changes of sleeve gastrectomy demonstrated.

 

No focal liver lesion or biliary ductal dilatation. Portal venous system is patent.

 

Suspected tiny 4 mm gallstone. No abnormal gallbladder distention.

 

Adrenal glands, kidneys and pancreas within normal limits. Small 1 cm cyst lateral aspect of the sple
en.

 

No dilated small bowel, free fluid, or free air. No mesenteric or retroperitoneal lymphadenopathy.

 

Normal appendix is visualized. Mild to moderate stool burden. No pericolonic inflammatory change. Dis
leonel colon not assessed as the pelvis is not imaged.

 

A borderline-sized 8 mm right lower quadrant mesenteric lymph node likely reactive/post inflammatory.


 

Pelvis not imaged.

 

Bones: Mild degenerative disc disease lower thoracic and lower lumbar spine.

 

 

IMPRESSION: 

 

1. STATUS POST SLEEVE GASTRECTOMY. THERE APPEARS TO BE A TINY HIATAL HERNIA PRESENT.

2. SUSPECT A TINY 4 MM GALLSTONE. NO INFLAMMATORY CHANGES BY CT.

3. MILD TO MODERATE STOOL BURDEN.

## 2025-01-28 ENCOUNTER — HOSPITAL ENCOUNTER (OUTPATIENT)
Dept: HOSPITAL 47 - RADUSWWP | Age: 35
Discharge: HOME | End: 2025-01-28
Attending: FAMILY MEDICINE
Payer: COMMERCIAL

## 2025-01-28 DIAGNOSIS — R10.32: Primary | ICD-10-CM

## 2025-01-28 DIAGNOSIS — Z32.01: ICD-10-CM

## 2025-01-28 LAB
ALBUMIN SERPL-MCNC: 4.3 G/DL (ref 3.8–4.9)
ALBUMIN/GLOB SERPL: 1.3 RATIO (ref 1.6–3.17)
ALP SERPL-CCNC: 59 U/L (ref 41–126)
ALT SERPL-CCNC: 15 U/L (ref 8–44)
ANION GAP SERPL CALC-SCNC: 9.8 MMOL/L (ref 4–12)
AST SERPL-CCNC: 17 U/L (ref 13–35)
BASOPHILS # BLD AUTO: 0.03 X 10*3/UL (ref 0–0.1)
BASOPHILS NFR BLD AUTO: 0.7 %
BUN SERPL-SCNC: 15.9 MG/DL (ref 9–27)
BUN/CREAT SERPL: 22.71 RATIO (ref 12–20)
CALCIUM SPEC-MCNC: 9.1 MG/DL (ref 8.7–10.3)
CHLORIDE SERPL-SCNC: 99 MMOL/L (ref 96–109)
CO2 SERPL-SCNC: 26.2 MMOL/L (ref 21.6–31.8)
EOSINOPHIL # BLD AUTO: 0.17 X 10*3/UL (ref 0.04–0.35)
EOSINOPHIL NFR BLD AUTO: 3.9 %
ERYTHROCYTE [DISTWIDTH] IN BLOOD BY AUTOMATED COUNT: 4.13 X 10*6/UL (ref 4.1–5.2)
ERYTHROCYTE [DISTWIDTH] IN BLOOD: 13.6 % (ref 11.5–14.5)
GLOBULIN SER CALC-MCNC: 3.3 G/DL (ref 1.6–3.3)
GLUCOSE SERPL-MCNC: 95 MG/DL (ref 70–110)
HCG SERPL-MCNC: <3 MIU/ML (ref 0–6)
HCT VFR BLD AUTO: 39.6 % (ref 37.2–46.3)
HGB BLD-MCNC: 12.7 G/DL (ref 12–15)
IMM GRANULOCYTES BLD QL AUTO: 0.2 %
LYMPHOCYTES # SPEC AUTO: 1.07 X 10*3/UL (ref 0.9–5)
LYMPHOCYTES NFR SPEC AUTO: 24.8 %
MCH RBC QN AUTO: 30.8 PG (ref 27–32)
MCHC RBC AUTO-ENTMCNC: 32.1 G/DL (ref 32–37)
MCV RBC AUTO: 95.9 FL (ref 80–97)
MONOCYTES # BLD AUTO: 0.39 X 10*3/UL (ref 0.2–1)
MONOCYTES NFR BLD AUTO: 9 %
NEUTROPHILS # BLD AUTO: 2.65 X 10*3/UL (ref 1.8–7.7)
NEUTROPHILS NFR BLD AUTO: 61.4 %
NRBC BLD AUTO-RTO: 0 X 10*3/UL (ref 0–0.01)
PLATELET # BLD AUTO: 262 X 10*3/UL (ref 140–440)
POTASSIUM SERPL-SCNC: 4.2 MMOL/L (ref 3.5–5.5)
PROT SERPL-MCNC: 7.6 G/DL (ref 6.2–8.2)
SODIUM SERPL-SCNC: 135 MMOL/L (ref 135–145)
WBC # BLD AUTO: 4.32 X 10*3/UL (ref 4.5–10)

## 2025-01-28 PROCEDURE — 84702 CHORIONIC GONADOTROPIN TEST: CPT

## 2025-01-28 PROCEDURE — 76856 US EXAM PELVIC COMPLETE: CPT

## 2025-01-28 PROCEDURE — 85025 COMPLETE CBC W/AUTO DIFF WBC: CPT

## 2025-01-28 PROCEDURE — 80053 COMPREHEN METABOLIC PANEL: CPT

## 2025-01-28 PROCEDURE — 76830 TRANSVAGINAL US NON-OB: CPT

## 2025-01-28 NOTE — US
EXAMINATION TYPE: US pelvis complete transvag

 

DATE OF EXAM: 1/28/2025

 

COMPARISON: NONE

 

CLINICAL INDICATION: Female, 34 years old with history of R10.32 LT LOWER QUAD PAIN; pain patient too
k preg test some positive and negative having Beta HCG today. 

 

TECHNIQUE:  Transvaginal (TV) and Transabdominal (TA) .  

 

Doppler imaging: Not performed.

 

FINDINGS:

 

Date of LMP:  unknown 

 

EXAM MEASUREMENTS:

 

Uterus:  8.2 x 4.5 x 5.7  cm

Endometrial Stripe: .6 cm

Right Ovary:  2.3 x 2.4 x 2.4 cm

Left Ovary:  2.1 x 1.8 x 2.1 cm

 

 

 

1. Uterus:  Anteverted   wnl

2. Endometrium:  wnl

3. Right Ovary:  wnl

4. Left Ovary:  wnl

5. Bilateral Adnexa:  wnl

6. Posterior cul-de-sac:  wnl

 

Unremarkable anteverted uterus without focal lesion identified. Normal-appearing endometrium. No defi
nitive gestational sac identified. Both ovaries appear unremarkable. No free fluid.

 

IMPRESSION: 

No ultrasound evidence for acute pelvic process. No definitive gestational sac identified.

 

 

[facility], Workstation: [id number], 1/28/2025 8:41 AM